# Patient Record
Sex: FEMALE | Race: OTHER | Employment: FULL TIME | URBAN - METROPOLITAN AREA
[De-identification: names, ages, dates, MRNs, and addresses within clinical notes are randomized per-mention and may not be internally consistent; named-entity substitution may affect disease eponyms.]

---

## 2018-10-16 ENCOUNTER — HOSPITAL ENCOUNTER (EMERGENCY)
Age: 33
Discharge: HOME | End: 2018-10-17
Attending: EMERGENCY MEDICINE
Payer: COMMERCIAL

## 2018-10-16 ENCOUNTER — APPOINTMENT (OUTPATIENT)
Dept: ULTRASOUND IMAGING | Age: 33
End: 2018-10-16
Attending: PHYSICIAN ASSISTANT
Payer: COMMERCIAL

## 2018-10-16 DIAGNOSIS — O03.9 MISCARRIAGE: Primary | ICD-10-CM

## 2018-10-16 DIAGNOSIS — D64.9 ANEMIA, UNSPECIFIED TYPE: ICD-10-CM

## 2018-10-16 LAB
ABO + RH BLD: NORMAL
ALBUMIN SERPL-MCNC: 3.2 G/DL (ref 3.5–5)
ALBUMIN/GLOB SERPL: 0.8 {RATIO} (ref 1.1–2.2)
ALP SERPL-CCNC: 65 U/L (ref 45–117)
ALT SERPL-CCNC: 18 U/L (ref 12–78)
ANION GAP SERPL CALC-SCNC: 10 MMOL/L (ref 5–15)
AST SERPL-CCNC: 14 U/L (ref 15–37)
BASOPHILS # BLD: 0 K/UL (ref 0–0.1)
BASOPHILS NFR BLD: 0 % (ref 0–1)
BILIRUB SERPL-MCNC: 0.2 MG/DL (ref 0.2–1)
BLOOD GROUP ANTIBODIES SERPL: NORMAL
BUN SERPL-MCNC: 8 MG/DL (ref 6–20)
BUN/CREAT SERPL: 10 (ref 12–20)
CALCIUM SERPL-MCNC: 8.6 MG/DL (ref 8.5–10.1)
CHLORIDE SERPL-SCNC: 97 MMOL/L (ref 97–108)
CO2 SERPL-SCNC: 22 MMOL/L (ref 21–32)
COMMENT, HOLDF: NORMAL
CREAT SERPL-MCNC: 0.82 MG/DL (ref 0.55–1.02)
DIFFERENTIAL METHOD BLD: ABNORMAL
EOSINOPHIL # BLD: 0.1 K/UL (ref 0–0.4)
EOSINOPHIL NFR BLD: 1 % (ref 0–7)
ERYTHROCYTE [DISTWIDTH] IN BLOOD BY AUTOMATED COUNT: 16.4 % (ref 11.5–14.5)
GLOBULIN SER CALC-MCNC: 4.1 G/DL (ref 2–4)
GLUCOSE SERPL-MCNC: 104 MG/DL (ref 65–100)
HCT VFR BLD AUTO: 36 % (ref 35–47)
HGB BLD-MCNC: 11.6 G/DL (ref 11.5–16)
HGB BLD-MCNC: 9.9 G/DL (ref 11.5–16)
IMM GRANULOCYTES # BLD: 0 K/UL (ref 0–0.04)
IMM GRANULOCYTES NFR BLD AUTO: 0 % (ref 0–0.5)
LYMPHOCYTES # BLD: 2.8 K/UL (ref 0.8–3.5)
LYMPHOCYTES NFR BLD: 27 % (ref 12–49)
MCH RBC QN AUTO: 25.9 PG (ref 26–34)
MCHC RBC AUTO-ENTMCNC: 32.2 G/DL (ref 30–36.5)
MCV RBC AUTO: 80.4 FL (ref 80–99)
MONOCYTES # BLD: 0.7 K/UL (ref 0–1)
MONOCYTES NFR BLD: 7 % (ref 5–13)
NEUTS SEG # BLD: 6.7 K/UL (ref 1.8–8)
NEUTS SEG NFR BLD: 65 % (ref 32–75)
NRBC # BLD: 0 K/UL (ref 0–0.01)
NRBC BLD-RTO: 0 PER 100 WBC
PLATELET # BLD AUTO: 339 K/UL (ref 150–400)
PMV BLD AUTO: 10.3 FL (ref 8.9–12.9)
POTASSIUM SERPL-SCNC: 4 MMOL/L (ref 3.5–5.1)
PROT SERPL-MCNC: 7.3 G/DL (ref 6.4–8.2)
RBC # BLD AUTO: 4.48 M/UL (ref 3.8–5.2)
SAMPLES BEING HELD,HOLD: NORMAL
SODIUM SERPL-SCNC: 129 MMOL/L (ref 136–145)
SPECIMEN EXP DATE BLD: NORMAL
WBC # BLD AUTO: 10.4 K/UL (ref 3.6–11)

## 2018-10-16 PROCEDURE — 80053 COMPREHEN METABOLIC PANEL: CPT | Performed by: PHYSICIAN ASSISTANT

## 2018-10-16 PROCEDURE — 76801 OB US < 14 WKS SINGLE FETUS: CPT

## 2018-10-16 PROCEDURE — 74011250636 HC RX REV CODE- 250/636: Performed by: PHYSICIAN ASSISTANT

## 2018-10-16 PROCEDURE — 36415 COLL VENOUS BLD VENIPUNCTURE: CPT | Performed by: EMERGENCY MEDICINE

## 2018-10-16 PROCEDURE — 85018 HEMOGLOBIN: CPT | Performed by: PHYSICIAN ASSISTANT

## 2018-10-16 PROCEDURE — 85025 COMPLETE CBC W/AUTO DIFF WBC: CPT | Performed by: PHYSICIAN ASSISTANT

## 2018-10-16 PROCEDURE — 96360 HYDRATION IV INFUSION INIT: CPT

## 2018-10-16 PROCEDURE — 86900 BLOOD TYPING SEROLOGIC ABO: CPT | Performed by: PHYSICIAN ASSISTANT

## 2018-10-16 PROCEDURE — 99284 EMERGENCY DEPT VISIT MOD MDM: CPT

## 2018-10-16 RX ADMIN — SODIUM CHLORIDE 1000 ML: 900 INJECTION, SOLUTION INTRAVENOUS at 22:00

## 2018-10-17 VITALS
WEIGHT: 139 LBS | HEART RATE: 78 BPM | OXYGEN SATURATION: 98 % | HEIGHT: 65 IN | RESPIRATION RATE: 14 BRPM | BODY MASS INDEX: 23.16 KG/M2 | SYSTOLIC BLOOD PRESSURE: 103 MMHG | DIASTOLIC BLOOD PRESSURE: 54 MMHG

## 2018-10-17 PROCEDURE — 96372 THER/PROPH/DIAG INJ SC/IM: CPT

## 2018-10-17 PROCEDURE — 74011250636 HC RX REV CODE- 250/636: Performed by: PHYSICIAN ASSISTANT

## 2018-10-17 RX ORDER — METHYLERGONOVINE MALEATE 0.2 MG/1
0.2 TABLET ORAL EVERY 6 HOURS
Qty: 3 TAB | Refills: 0 | Status: SHIPPED | OUTPATIENT
Start: 2018-10-17 | End: 2018-10-18

## 2018-10-17 RX ORDER — METHYLERGONOVINE MALEATE 0.2 MG/ML
0.2 INJECTION INTRAVENOUS ONCE
Status: COMPLETED | OUTPATIENT
Start: 2018-10-17 | End: 2018-10-17

## 2018-10-17 RX ORDER — IBUPROFEN 800 MG/1
800 TABLET ORAL
Qty: 20 TAB | Refills: 0 | Status: SHIPPED | OUTPATIENT
Start: 2018-10-17 | End: 2018-10-24

## 2018-10-17 RX ADMIN — METHYLERGONOVINE MALEATE 0.2 MG: 0.2 INJECTION, SOLUTION INTRAMUSCULAR; INTRAVENOUS at 00:16

## 2018-10-17 NOTE — DISCHARGE INSTRUCTIONS
Anemia: Care Instructions  Your Care Instructions    Anemia is a low level of red blood cells, which carry oxygen throughout your body. Many things can cause anemia. Lack of iron is one of the most common causes. Your body needs iron to make hemoglobin, a substance in red blood cells that carries oxygen from the lungs to your body's cells. Without enough iron, the body produces fewer and smaller red blood cells. As a result, your body's cells do not get enough oxygen, and you feel tired and weak. And you may have trouble concentrating. Bleeding is the most common cause of a lack of iron. You may have heavy menstrual bleeding or bleeding caused by conditions such as ulcers, hemorrhoids, or cancer. Regular use of aspirin or other anti-inflammatory medicines (such as ibuprofen) also can cause bleeding in some people. A lack of iron in your diet also can cause anemia, especially at times when the body needs more iron, such as during pregnancy, infancy, and the teen years. Your doctor may have prescribed iron pills. It may take several months of treatment for your iron levels to return to normal. Your doctor also may suggest that you eat foods that are rich in iron, such as meat and beans. There are many other causes of anemia. It is not always due to a lack of iron. Finding the specific cause of your anemia will help your doctor find the right treatment for you. Follow-up care is a key part of your treatment and safety. Be sure to make and go to all appointments, and call your doctor if you are having problems. It's also a good idea to know your test results and keep a list of the medicines you take. How can you care for yourself at home? · Take your medicines exactly as prescribed. Call your doctor if you think you are having a problem with your medicine. · If your doctor recommends iron pills, take them as directed:  ¨ Try to take the pills on an empty stomach about 1 hour before or 2 hours after meals. But you may need to take iron with food to avoid an upset stomach. ¨ Do not take antacids or drink milk or caffeine drinks (such as coffee, tea, or cola) at the same time or within 2 hours of the time that you take your iron. They can make it hard for your body to absorb the iron. ¨ Vitamin C (from food or supplements) helps your body absorb iron. Try taking iron pills with a glass of orange juice or some other food that is high in vitamin C, such as citrus fruits. ¨ Iron pills may cause stomach problems, such as heartburn, nausea, diarrhea, constipation, and cramps. Be sure to drink plenty of fluids, and include fruits, vegetables, and fiber in your diet each day. Iron pills often make your bowel movements dark or green. ¨ If you forget to take an iron pill, do not take a double dose of iron the next time you take a pill. ¨ Keep iron pills out of the reach of small children. An overdose of iron can be very dangerous. · Follow your doctor's advice about eating iron-rich foods. These include red meat, shellfish, poultry, eggs, beans, raisins, whole-grain bread, and leafy green vegetables. · Steam vegetables to help them keep their iron content. When should you call for help? Call 911 anytime you think you may need emergency care. For example, call if:    · You have symptoms of a heart attack. These may include:  ¨ Chest pain or pressure, or a strange feeling in the chest.  ¨ Sweating. ¨ Shortness of breath. ¨ Nausea or vomiting. ¨ Pain, pressure, or a strange feeling in the back, neck, jaw, or upper belly or in one or both shoulders or arms. ¨ Lightheadedness or sudden weakness. ¨ A fast or irregular heartbeat. After you call 911, the  may tell you to chew 1 adult-strength or 2 to 4 low-dose aspirin. Wait for an ambulance.  Do not try to drive yourself.     · You passed out (lost consciousness).    Call your doctor now or seek immediate medical care if:    · You have new or increased shortness of breath.     · You are dizzy or lightheaded, or you feel like you may faint.     · Your fatigue and weakness continue or get worse.     · You have any abnormal bleeding, such as:  ¨ Nosebleeds. ¨ Vaginal bleeding that is different (heavier, more frequent, at a different time of the month) than what you are used to. ¨ Bloody or black stools, or rectal bleeding. ¨ Bloody or pink urine.    Watch closely for changes in your health, and be sure to contact your doctor if:    · You do not get better as expected. Where can you learn more? Go to http://annalise-arti.info/. Enter R301 in the search box to learn more about \"Anemia: Care Instructions. \"  Current as of: May 7, 2018  Content Version: 11.8  © 0589-8257 [employer]. Care instructions adapted under license by [employer] (which disclaims liability or warranty for this information). If you have questions about a medical condition or this instruction, always ask your healthcare professional. [place] [address] any warranty or liability for your use of this information. Miscarriage: Care Instructions  Your Care Instructions    The loss of a pregnancy can be very hard. You may wonder why it happened or blame yourself. Miscarriages are common and are not caused by exercise, stress, or sex. Most happen because the fertilized egg in the uterus does not develop normally. There is no treatment that can stop a miscarriage. As long as you do not have heavy blood loss, fever, weakness, or other signs of infection, you can let a miscarriage follow its own course. This can take several days. Your body will recover over the next several weeks. Having a miscarriage does not mean you cannot have a normal pregnancy in the future. The doctor has checked you carefully, but problems can develop later. If you notice any problems or new symptoms, get medical treatment right away.   Follow-up care is a key part of your treatment and safety. Be sure to make and go to all appointments, and call your doctor if you are having problems. It's also a good idea to know your test results and keep a list of the medicines you take. How can you care for yourself at home? · You will probably have some vaginal bleeding for 1 to 2 weeks. It may be similar to or slightly heavier than a normal period. The bleeding should get lighter after a week. Use pads instead of tampons. You may use tampons during your next period, which should start in 3 to 6 weeks. · Take an over-the-counter pain medicine, such as acetaminophen (Tylenol), ibuprofen (Advil, Motrin), or naproxen (Aleve) for cramps. Read and follow all instructions on the label. You may have cramps for several days after the miscarriage. · Do not take two or more pain medicines at the same time unless the doctor told you to. Many pain medicines have acetaminophen, which is Tylenol. Too much acetaminophen (Tylenol) can be harmful. · Use a clear container to save any tissue that you pass. Take it to your doctor's office as soon as you can. · Do not have sex until the bleeding stops. · You may return to your normal activities if you feel well enough to do so. But you should avoid heavy exercise until the bleeding stops. · If you plan to get pregnant again, check with your doctor. Most doctors suggest waiting until you have had at least one normal period before you try to get pregnant. · If you do not want to get pregnant, ask your doctor about birth control. You can get pregnant again before your next period starts if you are not using birth control. · You may be low in iron because of blood loss. Eat a balanced diet that is high in iron and vitamin C. Foods rich in iron include red meat, shellfish, eggs, beans, and leafy green vegetables. Foods high in vitamin C include citrus fruits, tomatoes, and broccoli.  Talk to your doctor about whether you need to take iron pills or a multivitamin. · The loss of a pregnancy can be very hard. You may wonder why it happened and blame yourself. Talking to family members, friends, a counselor, or your doctor may help you cope with your loss. When should you call for help? Call 911 anytime you think you may need emergency care. For example, call if:    · You passed out (lost consciousness).    Call your doctor now or seek immediate medical care if:    · You have severe vaginal bleeding.     · You are dizzy or lightheaded, or you feel like you may faint.     · You have new or worse pain in your belly or pelvis.     · You have a fever.     · You have vaginal discharge that smells bad.    Watch closely for changes in your health, and be sure to contact your doctor if:    · You do not get better as expected. Where can you learn more? Go to http://annaliselogolineuparti.info/. Enter E802 in the search box to learn more about \"Miscarriage: Care Instructions. \"  Current as of: November 21, 2017  Content Version: 11.8  © 1863-1385 [employer]. Care instructions adapted under license by [employer] (which disclaims liability or warranty for this information). If you have questions about a medical condition or this instruction, always ask your healthcare professional. [address] any warranty or liability for your use of this information. We hope that we have addressed all of your medical concerns. The examination and treatment you received in the Emergency Department were for an emergent problem and were not intended as complete care. It is important that you follow up with your healthcare provider(s) for ongoing care. If your symptoms worsen or do not improve as expected, and you are unable to reach your usual health care provider(s), you should return to the Emergency Department.       Today's healthcare is undergoing tremendous change, and patient satisfaction surveys are one of the many tools to assess the quality of medical care. You may receive a survey from the [employer] regarding your experience in the Emergency Department. I hope that your experience has been completely positive, particularly the medical care that I provided. As such, please participate in the survey; anything less than excellent does not meet my expectations or intentions. [facility] and [facility] [place] participate in nationally recognized quality of care measures. If your blood pressure is greater than 120/80, as reported below, we urge that you seek medical care to address the potential of high blood pressure, commonly known as hypertension. Hypertension can be hereditary or can be caused by certain medical conditions, pain, stress, or \"white coat syndrome. \"       Please make an appointment with your health care provider(s) for follow up of your Emergency Department visit. VITALS:   Patient Vitals for the past 8 hrs:   Pulse Resp BP SpO2   10/16/18 2316 78 17 102/60 -   10/16/18 2116 (!) 116 20 120/59 97 %          Thank you for allowing us to provide you with medical care today. We realize that you have many choices for your emergency care needs. Please choose us in the future for any continued health care needs. Liss Joe, [address] [place] [address].   Office: [phone]            Recent Results (from the past 24 hour(s))   CBC WITH AUTOMATED DIFF    Collection Time: 10/16/18  9:21 PM   Result Value Ref Range    WBC 10.4 3.6 - 11.0 K/uL    RBC 4.48 3.80 - 5.20 M/uL    HGB 11.6 11.5 - 16.0 g/dL    HCT 36.0 35.0 - 47.0 %    MCV 80.4 80.0 - 99.0 FL    MCH 25.9 (L) 26.0 - 34.0 PG    MCHC 32.2 30.0 - 36.5 g/dL    RDW 16.4 (H) 11.5 - 14.5 %    PLATELET 739 052 - 915 K/uL    MPV 10.3 8.9 - 12.9 FL    NRBC 0.0 0  WBC    ABSOLUTE NRBC 0.00 0.00 - 0.01 K/uL    NEUTROPHILS 65 32 - 75 %    LYMPHOCYTES 27 12 - 49 % MONOCYTES 7 5 - 13 %    EOSINOPHILS 1 0 - 7 %    BASOPHILS 0 0 - 1 %    IMMATURE GRANULOCYTES 0 0.0 - 0.5 %    ABS. NEUTROPHILS 6.7 1.8 - 8.0 K/UL    ABS. LYMPHOCYTES 2.8 0.8 - 3.5 K/UL    ABS. MONOCYTES 0.7 0.0 - 1.0 K/UL    ABS. EOSINOPHILS 0.1 0.0 - 0.4 K/UL    ABS. BASOPHILS 0.0 0.0 - 0.1 K/UL    ABS. IMM. GRANS. 0.0 0.00 - 0.04 K/UL    DF AUTOMATED     METABOLIC PANEL, COMPREHENSIVE    Collection Time: 10/16/18  9:21 PM   Result Value Ref Range    Sodium 129 (L) 136 - 145 mmol/L    Potassium 4.0 3.5 - 5.1 mmol/L    Chloride 97 97 - 108 mmol/L    CO2 22 21 - 32 mmol/L    Anion gap 10 5 - 15 mmol/L    Glucose 104 (H) 65 - 100 mg/dL    BUN 8 6 - 20 MG/DL    Creatinine 0.82 0.55 - 1.02 MG/DL    BUN/Creatinine ratio 10 (L) 12 - 20      GFR est AA >60 >60 ml/min/1.73m2    GFR est non-AA >60 >60 ml/min/1.73m2    Calcium 8.6 8.5 - 10.1 MG/DL    Bilirubin, total 0.2 0.2 - 1.0 MG/DL    ALT (SGPT) 18 12 - 78 U/L    AST (SGOT) 14 (L) 15 - 37 U/L    Alk. phosphatase 65 45 - 117 U/L    Protein, total 7.3 6.4 - 8.2 g/dL    Albumin 3.2 (L) 3.5 - 5.0 g/dL    Globulin 4.1 (H) 2.0 - 4.0 g/dL    A-G Ratio 0.8 (L) 1.1 - 2.2     TYPE & SCREEN    Collection Time: 10/16/18  9:21 PM   Result Value Ref Range    Crossmatch Expiration 10/19/2018     ABO/Rh(D) O POSITIVE     Antibody screen NEG    SAMPLES BEING HELD    Collection Time: 10/16/18  9:22 PM   Result Value Ref Range    SAMPLES BEING HELD [email or web address]     COMMENT        Add-on orders for these samples will be processed based on acceptable specimen integrity and analyte stability, which may vary by analyte. HEMOGLOBIN    Collection Time: 10/16/18 11:18 PM   Result Value Ref Range    HGB 9.9 (L) 11.5 - 16.0 g/dL       Us Preg Uts < 14 Wks Sngl    Result Date: 10/17/2018  EXAM:  US PREG UTS < 14 WKS SNGL INDICATION:  Vaginal bleeding during first trimester pregnancy, nonviable fetus during obstetric ultrasound one day ago. LMP on 2018.  A1. COMPARISON:  None. TECHNIQUE: Transabdominal pelvic ultrasound. Patient declined endovaginal ultrasound. FINDINGS: Transabdominal ultrasound: Urinary bladder: Nondistended. Uterus: measures 12.6 x 5.7 x 6.1 cm, which is within normal limits during pregnancy. There is no sonographic myometrial abnormality. Endometrium: 2 cm in thickness, mildly heterogeneous. No intrauterine pregnancy. Right ovary: 2.9 x 1.7 x 2.1 cm. Blood flow is present in the right ovary. No right adnexal mass or cyst. Left ovary: 2.9 x 2.7 x 1.4 cm. Blood flow is present in the left ovary. No adnexal cyst or mass. Free fluid: None. Fluid is in the vagina. Endovaginal ultrasound: Not performed, patient declined. IMPRESSION: Thickened endometrium without intrauterine pregnancy. Fluid in the vagina. Findings are most compatible with nearly completed spontaneous .

## 2018-10-17 NOTE — ED PROVIDER NOTES
HPI Comments: Cesar Jackson is a 34 y.o. female  who presents by private vehicle to ER with c/o Patient presents with: 
Miscarriage. PAtient reports she is approximately 9-10 weeks pregnant, patient was seen by her OB yesterday and had an [address] showing no fetal heart beat. Patient reports vaginal bleeding that started 1 hour prior to arrival. Patient reports mild lower abdominal pain. She specifically denies any fevers, chills, nausea, vomiting, chest pain, shortness of breath, headache, rash, diarrhea, abdominal pain, urinary/bowel changes, sweating or weight loss. PCP: No primary care provider on file. PMHx significant for: No past medical history on file. PSHx significant for: No past surgical history on file. Social Hx: Tobacco use: Smoking status: Not on file Smokeless status: Not on file                    
; EtOH use: The patient states she drinks 0 per week.; Illicit Drug use: Allergies: 
No Known Allergies There are no other complaints, changes or physical findings at this time. Patient is a 34 y.o. female presenting with miscarriage. The history is provided by the patient. Miscarriage This is a new problem. The current episode started 1 to 2 hours ago. The problem occurs constantly. The problem has not changed since onset. The pain is at a severity of 4/10. The pain is mild. Pertinent negatives include no anorexia, no fever, no belching, no diarrhea, no flatus, no hematochezia, no melena, no nausea, no vomiting, no constipation, no dysuria, no frequency, no hematuria, no headaches, no arthralgias, no myalgias, no trauma, no chest pain, no testicular pain and no back pain. Nothing worsens the pain. The pain is relieved by nothing. The patient's surgical history non-contributory. No past medical history on file. No past surgical history on file. No family history on file. Social History Social History  Marital status: N/A  
 Spouse name: N/A  
 Number of children: N/A  
 Years of education: N/A Occupational History  Not on file. Social History Main Topics  Smoking status: Not on file  Smokeless tobacco: Not on file  Alcohol use Not on file  Drug use: Not on file  Sexual activity: Not on file Other Topics Concern  Not on file Social History Narrative ALLERGIES: Review of patient's allergies indicates no known allergies. Review of Systems Constitutional: Negative. Negative for fever. HENT: Negative. Eyes: Negative. Respiratory: Negative. Cardiovascular: Negative. Negative for chest pain. Gastrointestinal: Positive for abdominal pain. Negative for anorexia, constipation, diarrhea, flatus, hematochezia, melena, nausea and vomiting. Endocrine: Negative. Genitourinary: Positive for vaginal bleeding. Negative for dysuria, frequency, hematuria and testicular pain. Musculoskeletal: Negative. Negative for arthralgias, back pain and myalgias. Skin: Negative. Allergic/Immunologic: Negative. Neurological: Negative. Negative for headaches. Hematological: Negative. Psychiatric/Behavioral: Negative. All other systems reviewed and are negative. Vitals:  
 10/16/18 2116 BP: 120/59 Pulse: (!) 116 Resp: 20 SpO2: 97% Weight: 63 kg (139 lb) Height: 5' 5\" (1.651 m) Physical Exam  
Constitutional: She is oriented to person, place, and time. She appears well-developed. Non-toxic appearance. She does not have a sickly appearance. She does not appear ill. No distress. HENT:  
Head: Normocephalic and atraumatic. Right Ear: External ear normal.  
Left Ear: External ear normal.  
Nose: Nose normal.  
Mouth/Throat: Oropharynx is clear and moist. No oropharyngeal exudate. Eyes: Conjunctivae, EOM and lids are normal. Right eye exhibits no discharge. Left eye exhibits no discharge. Neck: Normal range of motion. No tracheal deviation present. No thyromegaly present. Cardiovascular: Regular rhythm, normal heart sounds, intact distal pulses and normal pulses. Tachycardia present. Pulmonary/Chest: Effort normal and breath sounds normal.  
Abdominal: Soft. Normal appearance and bowel sounds are normal. There is no tenderness. Genitourinary: There is bleeding in the vagina. Genitourinary Comments: Patient with large amount of blood and clots in vaginal vault. 2 large clots removed from vaginal vault. Musculoskeletal: Normal range of motion. Neurological: She is alert and oriented to person, place, and time. Skin: Skin is warm and dry. Psychiatric: She has a normal mood and affect. Judgment normal.  
  
 
MDM Number of Diagnoses or Management Options Anemia, unspecified type:  
Miscarriage:  
Diagnosis management comments: Assesment/Plan- 34 y.o. Patient presents with: 
Miscarriage 
differential includes: miscarriage, missed , anemia, retained products of conception. Labs and imaging reviewed with anemia. Patient seen by [facility] resident. Discharged with close follow up, return precautions given. Recommend OB follow up. Patient educated on reasons to return to the ED. Amount and/or Complexity of Data Reviewed Clinical lab tests: ordered and reviewed Tests in the radiology section of CPT®: ordered and reviewed Tests in the medicine section of CPT®: reviewed and ordered ED Course Procedures CONSULT NOTE:  
12:10 AM 
Anne Marie Joe PA-C spoke with Dr. Mau Renteria, Specialty: OB-hospitalist 
Discussed pt's hx, disposition, and available diagnostic and imaging results. Reviewed care plans. Consultant agrees with plans as outlined. Recommended methergine 0.2mg IM to slow down vaginal bleeding.

## 2018-10-17 NOTE — ED TRIAGE NOTES
Pt arrives with report of miscarriage, actively bleeding. Pt reports visiting OB yesterday, she was told that they were unable to find a heart beat and she needed to return in 2 weeks. Second pregnancy, miscarriage with previous pregnancy

## 2018-10-17 NOTE — PROGRESS NOTES
Gynecology Consult Name: Letty Hodgkin MRN: [id number] SSN: [id number] YOB: 1985  Age: 34 y.o. Sex: female Subjective: Chief complaint:  Abnormal uterine bleeding Rodrigo Carmona is a 34 y.o.  female  currently 9-10 weeks pregnant who comes in to the ED for a chief complain of vaginal bleeding that started on 10/16 at about 2100. Pt is followed OP by her OB Dr. Heriberto Healy at [facility] and was seen there on 10/15 for an US. At that time a fetal HB was not found and pt was asked to return in 2 weeks. Today patient states bleeding is consistent with a heavy period with cramping. She reports that the abdominal pain is mild. OB hx consists of previous pregnancy 1.5 yrs ago with no complications with delivery at 41 weeks She denies any SOB, CP, Dizziness, HA, fever, chills or nausea. OB History No data available No Known Allergies Current Outpatient Medications Medication Sig Dispense Refill  methylergonovine (METHERGINE) 0.2 mg tablet Take 1 Tab by mouth every six (6) hours for 3 doses. 3 Tab 0  ibuprofen (MOTRIN) 800 mg tablet Take 1 Tab by mouth every six (6) hours as needed for Pain for up to 7 days. 20 Tab 0 No family history on file. Social History Socioeconomic History  Marital status:  Spouse name: Not on file  Number of children: Not on file  Years of education: Not on file  Highest education level: Not on file Social Needs  Financial resource strain: Not on file  Food insecurity - worry: Not on file  Food insecurity - inability: Not on file  Transportation needs - medical: Not on file  Transportation needs - non-medical: Not on file Occupational History  Not on file Tobacco Use  Smoking status: Not on file Substance and Sexual Activity  Alcohol use: Not on file  Drug use: Not on file  Sexual activity: Not on file Other Topics Concern  Not on file Social History Narrative  Not on file Review of Systems Constitutional: Negative for chills, fever, malaise/fatigue and weight loss. Respiratory: Negative for cough, shortness of breath and wheezing. Cardiovascular: Negative for chest pain and palpitations. Gastrointestinal: Positive for abdominal pain. Negative for blood in stool, constipation, diarrhea, heartburn, nausea and vomiting. Skin: Negative for itching and rash. Neurological: Negative for dizziness, tingling, tremors and headaches. Objective:  
 
Vitals:  
 10/17/18 4387 10/17/18 0115 10/17/18 0116 10/17/18 9953 BP: 103/54 Pulse: 84 79 78 Resp: 13 17 14 SpO2: 98% 98% 98% 98% Weight:      
Height:      
 
 
General:  alert, cooperative, no distress, appears stated age Skin:  Normal.  
Lungs:  clear to auscultation bilaterally Heart:  regular rate and rhythm, S1, S2 normal, no murmur, click, rub or gallop Abdomen: soft, non-tender. Bowel sounds normal. No masses,  no organomegaly Genitourinary: Moderate bleeding from vaginal vault Extremities:  extremities normal, atraumatic, no cyanosis or edema, no edema Imaging: 
 
IMPRESSION:   
Thickened endometrium without intrauterine pregnancy. Fluid in the vagina. Findings are most compatible with nearly completed spontaneous  Plan:  
 
First Trimester Spontaneous : Hgb POA: 11.6, Hgb on dc: 9.9 
- Transvaginal US: nearly completed spontaneous  - Methergine IM 0.2 mg once in ED  
- Methergine 0.2 mg q6h for 3 doses on dc - Pt to follow up with OB within 24 hrs of dc Discussed the risks of surgery including the risks of bleeding, infection, deep vein thrombosis, and surgical injuries to internal organs including but not limited to the bowels, bladder, rectum, and female reproductive organs. The patient understands the risks; any and all questions were answered to the patient's satisfaction. Patient seen, examined, and discussed with Dr. Carrillo Grant Signed By:  Darlene Mederos MD   
 October 17, 2018

## 2018-10-17 NOTE — ED NOTES
Verbal shift change report given to Jose Luo RN (oncoming nurse) by Ye Bowden RN (offgoing nurse). Report included the following information ED Summary.

## 2023-01-12 LAB
C. TRACHOMATIS, EXTERNAL RESULT: NEGATIVE
N. GONORRHOEAE, EXTERNAL RESULT: NEGATIVE

## 2023-01-26 ENCOUNTER — HOSPITAL ENCOUNTER (OUTPATIENT)
Dept: PERINATAL CARE | Age: 38
Discharge: HOME | End: 2023-01-26
Attending: OBSTETRICS & GYNECOLOGY
Payer: COMMERCIAL

## 2023-01-26 PROCEDURE — 76813 OB US NUCHAL MEAS 1 GEST: CPT | Performed by: OBSTETRICS & GYNECOLOGY

## 2023-01-26 NOTE — PROGRESS NOTES
Ethyl Castleman was seen on January 26th, 2023 in our [address] [place] [address] office for genetic counseling regarding advanced maternal age and genetic screening available during pregnancy. Frances Ahmadi is 30years old and will be 37 at the ELIZA; [id number]. The ELIZA for this pregnancy is 8/8/2023. Genetic counseling was performed in person today. The patient was accompanied to her appointment by her partner and father of the baby (FOB), Gael. Impression and Recommendations:    - Advanced maternal age was reviewed with the patient, including relevant genetic screening and testing options. - The patient ELECTED to proceed with NIPT, which was ordered today through [facility]. - The patient DECLINED both diagnostic testing options and all carrier screening options at this time. - Due to the patient's history of a child with spina bifida occulta, a msAFP is recommended between 13 and 24 weeks gestation to screen for open neural tube defects in the current pregnancy. - An ultrasound and MFM consult were performed today by Dr. Pj Garcia MD. Please see her note for further details. The following information was discussed with the patient:    The association between advancing maternal age and increasing risk of chromosomal aneuploidy was reviewed. Based on the patient's age of 30 at delivery, the risk to have a baby with Down syndrome is 1 in 26 and the risk to have a baby with any chromosomal aneuploidy is 1 in 80. The benefits, risks and limitations of non-invasive prenatal testing (NIPT), ultrasonography, CVS and amniocentesis in the diagnosis of fetal aneuploidy were reviewed. Non-invasive prenatal testing (NIPT) was discussed as a genetic screening option. NIPT uses maternal serum to acquire circulating cell-free fetal DNA. The testing can be performed using one of two broad types of technology.   In the first, the cell-free fetal DNA is obtained via a maternal blood draw and then converted into a genomic DNA library for the determination of chromosome 21, 18, 13, and X and Y representation based on massively parallel genomic sequencing. This testing detects Down syndrome with greater than 99% accuracy, Trisomy 18 with >97% sensitivity, and Trisomy 13 with greater than 87% sensitivity. Trinh syndrome is detected in 95% of cases, and gender is accurately predicted in >99% of cases. With the second method, single-nucleotide polymorphisms (SNPs) are used to perform targeted sequencing of areas of interest (representing chromosomes 21, 18, 13, and the sex chromosomes). Triploidy can also be reliably detected. Testing for twin pregnancies can now be performed via the SNP method. The sensitivity is thought to be greater than 99% across all 4 pairs of chromosomes targeted in almaraz pregnancies. Which of these technologies is appropriate for a given patient depends upon several factors and is determined by the genetic counselor. Testing is offered most frequently for women considered at high risk for chromosomal aneuploidy. This includes but is not limited to indications such as advanced maternal age, abnormal maternal serum screening result, ultrasound abnormalities, and previous history of a child with a chromosomal aneuploidy. ACOG recently published new recommendations that this testing also be offered as a screening option in low risk pregnancies, although insurance typically does not cover it. While this type of testing is often called \"non-invasive prenatal diagnosis,\" it is important to distinguish that this testing is not considered diagnostic. There is the possibility of an inconclusive result. Inconclusive results would require follow-up CVS or amniocentesis to determine fetal karyotype. All abnormal results should be followed up with a confirmatory CVS or amniocentesis.  Unlike CVS and amniocentesis, this test cannot detect other chromosome abnormalities such as chromosomal rearrangements or mosaicism. Currently, insurance companies may or may not cover the cost of this testing. We always provide patients with the most up to date price quotes and coverage information that we have in [facility]. Because insurance companies are so rapidly changing what will be covered or declined, we cannot guarantee a precise patient bill. For patients that elect NIPT, a single marker AFP can also be ordered after 15 weeks gestation to determine open neural tube defect (ONTD) risk information. Chorionic villus sampling (CVS) is typically performed between 11 and 14 weeks gestation during pregnancy. The test removes a small sample of the placenta, either using a needle through the stomach (transabdominal CVS) or a catheter through the cervix (transcervical CVS). The risk of complications that could lead to a miscarriage is approximately 1 in 300. Amniocentesis is typically performed between 12 and 20 weeks gestation, although it can often be performed earlier or later with reasonable safety, depending on the circumstances of the case. The risk for complication from amniocentesis is 1/800. The accuracy of amniocentesis is greater than 99% for chromosomal abnormalities such as aneuploidy, and approximately 98% for open neural tube defects when used in combination with detailed anatomic ultrasound. The accuracy of other genetic testing ordered varies depending on the condition being tested for and the laboratory performing the testing. In addition, there are conditions that cannot be tested for prenatally, and would therefore not be detectable with amniocentesis. At this time the patient ELECTED to proceed with NIPT. Results are pending and will be communicated to the patient as soon as they are available. The availability, benefits, risks, and limitations of genetic carrier screening was reviewed with the couple.  The most recent ACOG guidelines recommend that all pregnant patients be offered cystic fibrosis (CF) and spinal muscular atrophy (SMA) carrier screening. Both CF and SMA are recessive conditions which would result in a 25% risk for an affected child in the event both parents were carriers. Expanded carrier screening looks at up to 274 different conditions (including CF and SMA), with a variety of other panel options in between depending on the patient's interest and concern for cost. At this time the patient DECLINED all carrier screening options. Patricia Gurrola reports that one of her previous children has spina bifida occulta. NTDs have an incidence of about 2-4 per 1,000 births, with the highest incidence in  and  populations. Neural tube closure takes place in the 4th week of gestation. Failure of the neural tube to close at the caudal end results in spina bifida; failure of the neural tube to close at the cranial end results in anencephaly. The clinical severity of NTDs is related to the location of the lesion and extent of involvement. In the absence of another etiology (i.e., syndrome), inheritance for NTDs is considered to be multifactorial with recurrence risks based on empiric data (3-5% for first-degree relatives). We discussed the etiology, prognosis and recurrence risks for NTDs. SBO is a mild form of spina bifida present in up to 20% of the population. Most commonly, this condition involves one vertebral arch and is considered to be part of normal variation without any clinical significance. Patricia Gurrola states that she was taking prenatal vitamins before becoming pregnant. Most multivitamins contain 0.4 mg of folic acid. The availability of folic acid supplementation before the couple's next pregnancy as a precaution was reviewed. It was emphasized that taking extra folic acid does not ELIMINATE the risk to have a child with a NTD, but it does significantly decrease the risk. Likewise, not taking folic acid does not guarantee that the pregnancy will have a NTD.  Based on this family history, the risks, benefits and limitations of maternal serum AFP, amniocentesis and level II ultrasound in screening for ONTDs were discussed. After our discussion, Manohar Elena and Sunny Cummings verbalized understanding of the information presented to them and had no further questions or concerns at this time. Toni Whalen MS, [facility]  Licensed, Certified Genetic Counselor    30 minutes were spent in person with the patient for genetic evaluation including risk assessment, counseling regarding relevant genetic disorders, explanation of appropriate genetic testing options, and arranging genetic testing.

## 2023-02-06 ENCOUNTER — APPOINTMENT (OUTPATIENT)
Dept: ULTRASOUND IMAGING | Age: 38
End: 2023-02-06
Attending: STUDENT IN AN ORGANIZED HEALTH CARE EDUCATION/TRAINING PROGRAM
Payer: COMMERCIAL

## 2023-02-06 ENCOUNTER — HOSPITAL ENCOUNTER (EMERGENCY)
Age: 38
Discharge: HOME | End: 2023-02-06
Attending: EMERGENCY MEDICINE
Payer: COMMERCIAL

## 2023-02-06 VITALS
SYSTOLIC BLOOD PRESSURE: 115 MMHG | WEIGHT: 185 LBS | TEMPERATURE: 98.3 F | OXYGEN SATURATION: 98 % | BODY MASS INDEX: 30.82 KG/M2 | HEART RATE: 92 BPM | DIASTOLIC BLOOD PRESSURE: 80 MMHG | RESPIRATION RATE: 18 BRPM | HEIGHT: 65 IN

## 2023-02-06 DIAGNOSIS — O46.92 VAGINAL BLEEDING IN PREGNANCY, SECOND TRIMESTER: ICD-10-CM

## 2023-02-06 DIAGNOSIS — O20.0 THREATENED MISCARRIAGE: Primary | ICD-10-CM

## 2023-02-06 LAB
ALBUMIN SERPL-MCNC: 3.4 G/DL (ref 3.5–5)
ALBUMIN/GLOB SERPL: 0.8 (ref 1.1–2.2)
ALP SERPL-CCNC: 51 U/L (ref 45–117)
ALT SERPL-CCNC: 14 U/L (ref 12–78)
ANION GAP SERPL CALC-SCNC: 8 MMOL/L (ref 5–15)
APPEARANCE UR: ABNORMAL
AST SERPL-CCNC: 4 U/L (ref 15–37)
BACTERIA URNS QL MICRO: NEGATIVE /HPF
BASOPHILS # BLD: 0 K/UL (ref 0–0.1)
BASOPHILS NFR BLD: 0 % (ref 0–1)
BILIRUB SERPL-MCNC: 0.3 MG/DL (ref 0.2–1)
BILIRUB UR QL CFM: NEGATIVE
BUN SERPL-MCNC: 7 MG/DL (ref 6–20)
BUN/CREAT SERPL: 14 (ref 12–20)
CALCIUM SERPL-MCNC: 9.4 MG/DL (ref 8.5–10.1)
CHLORIDE SERPL-SCNC: 107 MMOL/L (ref 97–108)
CO2 SERPL-SCNC: 20 MMOL/L (ref 21–32)
COLOR UR: ABNORMAL
COMMENT, HOLDF: NORMAL
CREAT SERPL-MCNC: 0.51 MG/DL (ref 0.55–1.02)
DIFFERENTIAL METHOD BLD: NORMAL
EOSINOPHIL # BLD: 0.1 K/UL (ref 0–0.4)
EOSINOPHIL NFR BLD: 1 % (ref 0–7)
EPITH CASTS URNS QL MICRO: ABNORMAL /LPF
ERYTHROCYTE [DISTWIDTH] IN BLOOD BY AUTOMATED COUNT: 13.2 % (ref 11.5–14.5)
GLOBULIN SER CALC-MCNC: 4.2 G/DL (ref 2–4)
GLUCOSE SERPL-MCNC: 94 MG/DL (ref 65–100)
GLUCOSE UR STRIP.AUTO-MCNC: NEGATIVE MG/DL
HCG UR QL: POSITIVE
HCT VFR BLD AUTO: 38.8 % (ref 35–47)
HGB BLD-MCNC: 13 G/DL (ref 11.5–16)
HGB UR QL STRIP: ABNORMAL
IMM GRANULOCYTES # BLD AUTO: 0 K/UL (ref 0–0.04)
IMM GRANULOCYTES NFR BLD AUTO: 0 % (ref 0–0.5)
KETONES UR QL STRIP.AUTO: NEGATIVE MG/DL
LEUKOCYTE ESTERASE UR QL STRIP.AUTO: ABNORMAL
LYMPHOCYTES # BLD: 2.6 K/UL (ref 0.8–3.5)
LYMPHOCYTES NFR BLD: 28 % (ref 12–49)
MCH RBC QN AUTO: 28.8 PG (ref 26–34)
MCHC RBC AUTO-ENTMCNC: 33.5 G/DL (ref 30–36.5)
MCV RBC AUTO: 85.8 FL (ref 80–99)
MONOCYTES # BLD: 0.6 K/UL (ref 0–1)
MONOCYTES NFR BLD: 6 % (ref 5–13)
NEUTS SEG # BLD: 5.9 K/UL (ref 1.8–8)
NEUTS SEG NFR BLD: 65 % (ref 32–75)
NITRITE UR QL STRIP.AUTO: NEGATIVE
NRBC # BLD: 0 K/UL (ref 0–0.01)
NRBC BLD-RTO: 0 PER 100 WBC
PH UR STRIP: 6.5 (ref 5–8)
PLATELET # BLD AUTO: 304 K/UL (ref 150–400)
PMV BLD AUTO: 10.2 FL (ref 8.9–12.9)
POTASSIUM SERPL-SCNC: 3.8 MMOL/L (ref 3.5–5.1)
PROT SERPL-MCNC: 7.6 G/DL (ref 6.4–8.2)
PROT UR STRIP-MCNC: 100 MG/DL
RBC # BLD AUTO: 4.52 M/UL (ref 3.8–5.2)
RBC #/AREA URNS HPF: >100 /HPF (ref 0–5)
SAMPLES BEING HELD,HOLD: NORMAL
SODIUM SERPL-SCNC: 135 MMOL/L (ref 136–145)
SP GR UR REFRACTOMETRY: 1.01 (ref 1–1.03)
UR CULT HOLD, URHOLD: NORMAL
UROBILINOGEN UR QL STRIP.AUTO: 0.2 EU/DL (ref 0.2–1)
WBC # BLD AUTO: 9.2 K/UL (ref 3.6–11)
WBC URNS QL MICRO: ABNORMAL /HPF (ref 0–4)
YEAST URNS QL MICRO: PRESENT

## 2023-02-06 PROCEDURE — 81001 URINALYSIS AUTO W/SCOPE: CPT

## 2023-02-06 PROCEDURE — 99284 EMERGENCY DEPT VISIT MOD MDM: CPT

## 2023-02-06 PROCEDURE — 76815 OB US LIMITED FETUS(S): CPT

## 2023-02-06 PROCEDURE — 86900 BLOOD TYPING SEROLOGIC ABO: CPT

## 2023-02-06 PROCEDURE — 85025 COMPLETE CBC W/AUTO DIFF WBC: CPT

## 2023-02-06 PROCEDURE — 81025 URINE PREGNANCY TEST: CPT

## 2023-02-06 PROCEDURE — 80053 COMPREHEN METABOLIC PANEL: CPT

## 2023-02-06 PROCEDURE — 36415 COLL VENOUS BLD VENIPUNCTURE: CPT

## 2023-02-07 LAB
ABO + RH BLD: NORMAL
BLOOD BANK CMNT PATIENT-IMP: NORMAL

## 2023-02-07 NOTE — ED TRIAGE NOTES
Pt presents to the ED with c/o vaginal bleeding while 14 weeks pregnant. Pt states that the bleeding started about 45 minutes ago and that she feels like she is about to start cramping. Pt reports that she takes Lovenox injections due to her having clots in her previous pregnancy. Pt reports that she had a miscarriage during her second pregnancy.

## 2023-02-07 NOTE — ED PROVIDER NOTES
Patient is a 37-year-old female who presents ED complaining of vaginal bleeding with chart of 45 minutes prior to arrival.  Patient reports she is currently 13w6d gestation. She started with spontaneous vaginal bleeding and states she feels like she is \"about to start cramping. \"  Denies any pelvic pain, dysuria, urinary frequency, urgency, nausea, vomiting. Patient reports she had a miscarriage during her second pregnancy at about 9 weeks gestation. She was recently seen by her OB/GYN as well as perinatology and had a normal work-up performed. States she was started on Lovenox injections 2 weeks ago secondary to having a blood clot in a prior pregnancy. OB/GYN is Dr. Sona Barr, perinatologist Dr. Thalia Dickey. No past medical history on file. No past surgical history on file. No family history on file. Social History     Socioeconomic History    Marital status:      Spouse name: Not on file    Number of children: Not on file    Years of education: Not on file    Highest education level: Not on file   Occupational History    Not on file   Tobacco Use    Smoking status: Not on file    Smokeless tobacco: Not on file   Substance and Sexual Activity    Alcohol use: Not on file    Drug use: Not on file    Sexual activity: Not on file   Other Topics Concern    Not on file   Social History Narrative    Not on file     Social Determinants of Health     Financial Resource Strain: Not on file   Food Insecurity: Not on file   Transportation Needs: Not on file   Physical Activity: Not on file   Stress: Not on file   Social Connections: Not on file   Intimate Partner Violence: Not on file   Housing Stability: Not on file         ALLERGIES: Patient has no known allergies. Review of Systems   Constitutional:  Negative for chills and fever. Respiratory:  Negative for shortness of breath. Cardiovascular:  Negative for chest pain. Gastrointestinal:  Negative for abdominal pain, nausea and vomiting. Genitourinary:  Positive for vaginal bleeding. Negative for decreased urine volume, difficulty urinating, flank pain, menstrual problem, pelvic pain, vaginal discharge and vaginal pain. All other systems reviewed and are negative. Vitals:    02/06/23 1957   BP: 115/80   Pulse: 92   Resp: 18   Temp: 98.3 °F (36.8 °C)   SpO2: 98%   Weight: 83.9 kg (185 lb)   Height: 5' 5\" (1.651 m)            Physical Exam  Vitals and nursing note reviewed. Constitutional:       General: She is not in acute distress. Appearance: Normal appearance. She is well-developed. She is not toxic-appearing. HENT:      Head: Normocephalic and atraumatic. Nose: Nose normal.      Mouth/Throat:      Mouth: Mucous membranes are moist.   Eyes:      General: Lids are normal.      Extraocular Movements: Extraocular movements intact. Conjunctiva/sclera: Conjunctivae normal.   Cardiovascular:      Rate and Rhythm: Normal rate and regular rhythm. Pulses: Normal pulses. Heart sounds: Normal heart sounds, S1 normal and S2 normal.   Pulmonary:      Effort: Pulmonary effort is normal. No accessory muscle usage. Breath sounds: Normal breath sounds. Abdominal:      Palpations: Abdomen is soft. Tenderness: There is no abdominal tenderness. There is no guarding or rebound. Genitourinary:     Comments: Exam deferred   Musculoskeletal:         General: Normal range of motion. Cervical back: Normal range of motion and neck supple. Skin:     General: Skin is warm and dry. Capillary Refill: Capillary refill takes less than 2 seconds. Neurological:      General: No focal deficit present. Mental Status: She is alert and oriented to person, place, and time. Mental status is at baseline. Psychiatric:         Attention and Perception: Attention normal.         Mood and Affect: Mood and affect normal.         Speech: Speech normal.         Behavior: Behavior is cooperative.          Thought Content: Thought content normal.         Cognition and Memory: Cognition normal.         Judgment: Judgment normal.        Medical Decision Making  Patient is 13 weeks 6 days gestation who presents with vaginal bleeding which started PTA. She is on lovenox prophylactically due to history of blood clot in prior pregnancy. VSS. H&H are stable. Ultrasound shows viable IUP, fhr 145bpm. Patient with threatened miscarriage. Discussed results with patient and need for close follow-up with her OBGYN for re-evaluation. Strict return to ER precautions discussed in detail. All questions addressed and answered. Amount and/or Complexity of Data Reviewed  Labs: ordered. Radiology: ordered. ECG/medicine tests: ordered.            Procedures

## 2023-02-07 NOTE — DISCHARGE INSTRUCTIONS
Call your OBGYN and schedule an appointment for close follow-up. If you develop worsening bleeding, light headedness, shortness of breath, and syncope - return to ER.

## 2023-02-09 LAB
ABO, EXTERNAL RESULT: NORMAL
HEP B, EXTERNAL RESULT: NEGATIVE
HIV, EXTERNAL RESULT: NEGATIVE
RH FACTOR, EXTERNAL RESULT: POSITIVE
RPR, EXTERNAL RESULT: NON REACTIVE
RUBELLA TITER, EXTERNAL RESULT: NORMAL

## 2023-03-20 ENCOUNTER — NURSE NAVIGATOR (OUTPATIENT)
Dept: PERINATAL CARE | Age: 38
End: 2023-03-20

## 2023-03-20 ENCOUNTER — HOSPITAL ENCOUNTER (OUTPATIENT)
Dept: PERINATAL CARE | Age: 38
Discharge: HOME | End: 2023-03-20
Attending: OBSTETRICS & GYNECOLOGY

## 2023-03-20 VITALS
HEART RATE: 89 BPM | SYSTOLIC BLOOD PRESSURE: 110 MMHG | HEIGHT: 65 IN | DIASTOLIC BLOOD PRESSURE: 73 MMHG | WEIGHT: 190 LBS | BODY MASS INDEX: 31.65 KG/M2

## 2023-03-22 NOTE — PROCEDURES
Indication  ========    History of DVT  Advanced maternal age  Class 1 obesity    Method  ======    Transabdominal ultrasound examination. View: Good view    Dating  ======    LMP on: 11/10/2022  GA by LMP 18 w + 4 d  ELIZA by LMP: 8/17/2023  Previous Ultrasound on: 1/12/2023  Type of prior assessment: GA  GA at prior assessment date 10 w + 2 d  GA by previous U/S 23 w + 6 d  ELIZA by previous Ultrasound: 8/8/2023  Ultrasound examination on: 3/20/2023  GA by U/S based upon: [facility], [place] by [facility], BPD, Femur, HC  GA by U/S 20 w + 2 d  ELIZA by U/S: 8/5/2023  Assigned: based on ultrasound (GA), selected on 01/26/2023  Assigned GA 19 w + 6 d  Assigned ELIZA: 8/8/2023    Fetal Biometry  ============    Standard  BPD 45.8 mm 19w 6d 49% Hadlock  OFD 63.2 mm 21w 4d 94% Manas  .1 mm 20w 0d 46% Hadlock  Cerebellum tr 20.4 mm 19w 4d 61% Hill  Nuchal fold 5.1 mm  .3 mm 20w 6d 77% Hadlock  Femur 32.6 mm 20w 1d 54% Hadlock  Humerus 30.4 mm 20w 0d 61% Manas   g 20w 3d 78% Hadlock  EFW (lb) 0 lb  EFW (oz) 13 oz  EFW by: Hadlock (BPD-HC-AC-FL)  Extended   7.6 mm  CM 4.4 mm  31% Nicolaides  Nasal bone 6.3 mm  Head / Face / Neck  Nasal bone: present  Other Structures   bpm    General Evaluation  ==============    Cardiac activity present.  bpm. Fetal movements: visualized. Presentation: breech  Placenta: Placental site: Fundal  Umbilical cord: Cord vessels: 3 vessel cord, normal insertion, 3 vessel cord.  Insertion site: normal insertion  Amniotic fluid: Amount of AF: normal amount    Fetal Anatomy  ===========    Cranium: normal  Lateral ventricles: normal  Choroid plexus: normal  Midline falx: normal  Cavum septi pellucidi: normal  Cerebellum: normal  Cisterna magna: normal  Head / Neck  Neck: normal  Lips: normal  Profile: normal  Nose: normal  Face  Coronal face: normal  Nasal bone: present  Palate: normal  Maxilla: normal  Mandible: normal  Orbits: normal  4-chamber view: normal  RVOT view: normal  LVOT view: normal  3-vessel view: normal  3-vessel-trachea view: normal  Heart / Thorax  Situs: situs solitus (normal)  Aortic arch view: normal  Bicaval view: normal  Ductal arch view: normal  Interventricular septum: normal  Diaphragm: normal  Cord insertion: normal  Stomach: normal  Kidneys: normal  Bladder: normal  Genitals: normal  Abdomen  Rt renal artery: normal  Lt renal artery: normal  Cervical spine: normal  Thoracic spine: normal  Lumbar spine: normal  Sacral spine: normal  Rt arm: normal  Lt arm: normal  Rt hand: normal  Rt fingers: normal  Lt hand: normal  Lt fingers: normal  Rt leg: normal  Lt leg: normal  Rt foot: normal  Rt toes: normal  Lt foot: normal  Lt toes: normal  Fetal sex: male  Wants to know fetal sex: yes    Maternal Structures  ===============    Ovaries / Tubes / Adnexa  Rt ovary: Normal  Rt ovary D1 2.0 cm  Rt ovary D2 2.2 cm  Rt ovary D3 2.1 cm  Rt ovary Vol 4.9 cm³  Lt ovary: Normal  Lt ovary D1 2.7 cm  Lt ovary D2 3.1 cm  Lt ovary D3 1.4 cm  Lt ovary Vol 5.9 cm³    Findings  =======    Detailed Fetal Anatomy (47175)    This is a almaraz pregnancy with biometry consistent with prior dating. Anatomy appears normal as noted above. Normal fluid and fetal movement are noted. Plan of Care  ==========    See Velarde had a DVT while taking OCPs. She is currently taking Lovenox 40 mg daily. 110/73    Patient was counseled on the findings. Risks/benefits of amniocentesis were reviewed which she declines. Questions and concerns were addressed. A total of 15 minutes was spent on this visit reviewing previous notes, counseling the patient and documenting the findings in the note.     Follow-up  ========    Growth in 4 weeks    Coding  ======    Code: G14.624  Description: Supervision of elderly multigravida  Code: I43.164  Description: Personal history of pulmonary embolism  Code: E66.09  Description: Other obesity due to excess calories  Code: 76668  Description: Ultrasound, pregnant uterus, real time with image documentation, fetal and maternal evaluation plus detailed fetal anatomic examination, transabdominal  approach;single or first gestation

## 2023-04-18 ENCOUNTER — HOSPITAL ENCOUNTER (OUTPATIENT)
Dept: PERINATAL CARE | Age: 38
Discharge: HOME | End: 2023-04-18
Attending: OBSTETRICS & GYNECOLOGY
Payer: COMMERCIAL

## 2023-04-18 PROCEDURE — 76816 OB US FOLLOW-UP PER FETUS: CPT | Performed by: OBSTETRICS & GYNECOLOGY

## 2023-04-18 NOTE — PROCEDURES
Indication  ========    History of DVT  Advanced maternal age    Method  ======    Transabdominal ultrasound examination. View: Sufficient    Pregnancy  =========    Ralph pregnancy. Number of fetuses: 1    Dating  ======    LMP on: 11/10/2022  GA by LMP 25 w + 5 d  ELIZA by LMP: 8/17/2023  Previous Ultrasound on: 1/12/2023  Type of prior assessment: GA  GA at prior assessment date 10 w + 2 d  GA by previous U/S 24 w + 0 d  ELIZA by previous Ultrasound: 8/8/2023  Ultrasound examination on: 4/18/2023  GA by U/S based upon: [facility], BPD, Femur, HC  GA by U/S 25 w + 0 d  ELIZA by U/S: 8/1/2023  Assigned: based on ultrasound (GA), selected on 01/26/2023  Assigned GA 24 w + 0 d  Assigned ELIZA: 8/8/2023    Fetal Biometry  ============    Standard  BPD 61.1 mm 24w 6d 74% Hadlock  OFD 82.3 mm 26w 5d >99% Manas  .7 mm 25w 0d 70% Hadlock  .9 mm 25w 2d 81% Hadlock  Femur 45.2 mm 25w 0d 68% Hadlock   g 24w 6d 88% Hadlock  EFW (lb) 1 lb  EFW (oz) 11 oz  EFW by: Hadlock (BPD-HC-AC-FL)  Extended   6.4 mm  Other Structures   bpm    General Evaluation  ==============    Cardiac activity present.  bpm. Fetal movements: visualized. Presentation: cephalic  Placenta: Placental site: anterior  Umbilical cord: Cord vessels: 3 vessel cord, normal insertion  Amniotic fluid: Amount of AF: normal amount. MVP 6.2 cm    Fetal Anatomy  ===========    Cranium: normal  Lateral ventricles: normal  Cerebellum: normal  Lips: normal  Nose: normal  4-chamber view: normal  RVOT view: normal  LVOT view: normal  3-vessel view: normal  3-vessel-trachea view: normal  Cord insertion: normal  Stomach: normal  Kidneys: normal  Bladder: normal  Fetal sex: male  Wants to know fetal sex: yes    Findings  =======    Follow up ultrasound (35902)    This is a ralph pregnancy with biometry consistent with prior dating. Anatomy appears normal as noted above. Normal fluid and fetal movement are noted.     Growth is appropriate for gestational age at 88%. Plan of Care  ==========    Georgia Lawson had a DVT while taking OCPs. She is currently taking Lovenox 40 mg daily.     Follow-up  ========    Follow up at 32 weeks gestation for fetal growth    Coding  ======    Code: O09.522  Description: Supervision of elderly multigravida  Code: K15.700  Description: Personal history of pulmonary embolism  Code: 81543  Description: Ultrasound, pregnant uterus, real time with image documentation, follow up, transabdominal approach per fetus

## 2023-06-01 ENCOUNTER — HOSPITAL ENCOUNTER (OUTPATIENT)
Facility: HOSPITAL | Age: 38
Discharge: HOME | End: 2023-06-01
Attending: OBSTETRICS & GYNECOLOGY | Admitting: OBSTETRICS & GYNECOLOGY
Payer: COMMERCIAL

## 2023-06-01 VITALS
WEIGHT: 196 LBS | TEMPERATURE: 97.8 F | RESPIRATION RATE: 18 BRPM | DIASTOLIC BLOOD PRESSURE: 60 MMHG | BODY MASS INDEX: 32.65 KG/M2 | HEIGHT: 65 IN | HEART RATE: 84 BPM | SYSTOLIC BLOOD PRESSURE: 112 MMHG | OXYGEN SATURATION: 96 %

## 2023-06-01 LAB
APPEARANCE UR: CLEAR
BACTERIA URNS QL MICRO: NEGATIVE /HPF
BILIRUB UR QL: NEGATIVE
COLOR UR: ABNORMAL
EPITH CASTS URNS QL MICRO: ABNORMAL /LPF
GLUCOSE UR STRIP.AUTO-MCNC: NEGATIVE MG/DL
HGB UR QL STRIP: NEGATIVE
HYALINE CASTS URNS QL MICRO: ABNORMAL /LPF (ref 0–2)
KETONES UR QL STRIP.AUTO: NEGATIVE MG/DL
LEUKOCYTE ESTERASE UR QL STRIP.AUTO: ABNORMAL
NITRITE UR QL STRIP.AUTO: NEGATIVE
PH UR STRIP: 7.5 (ref 5–8)
PROT UR STRIP-MCNC: NEGATIVE MG/DL
RBC #/AREA URNS HPF: ABNORMAL /HPF (ref 0–5)
SP GR UR REFRACTOMETRY: 1.01 (ref 1–1.03)
URINE CULTURE IF INDICATED: ABNORMAL
UROBILINOGEN UR QL STRIP.AUTO: 0.2 EU/DL (ref 0.2–1)
WBC URNS QL MICRO: ABNORMAL /HPF (ref 0–4)

## 2023-06-01 PROCEDURE — 6370000000 HC RX 637 (ALT 250 FOR IP): Performed by: OBSTETRICS & GYNECOLOGY

## 2023-06-01 PROCEDURE — 2580000003 HC RX 258: Performed by: OBSTETRICS & GYNECOLOGY

## 2023-06-01 PROCEDURE — 6360000002 HC RX W HCPCS: Performed by: OBSTETRICS & GYNECOLOGY

## 2023-06-01 PROCEDURE — 87086 URINE CULTURE/COLONY COUNT: CPT

## 2023-06-01 PROCEDURE — 4500000002 HC ER NO CHARGE

## 2023-06-01 PROCEDURE — 81001 URINALYSIS AUTO W/SCOPE: CPT

## 2023-06-01 PROCEDURE — 99284 EMERGENCY DEPT VISIT MOD MDM: CPT

## 2023-06-01 RX ORDER — POLYETHYLENE GLYCOL 3350 17 G/17G
17 POWDER, FOR SOLUTION ORAL DAILY
Status: DISCONTINUED | OUTPATIENT
Start: 2023-06-01 | End: 2023-06-02 | Stop reason: HOSPADM

## 2023-06-01 RX ORDER — ENOXAPARIN SODIUM 300 MG/3ML
40 INJECTION INTRAVENOUS; SUBCUTANEOUS DAILY
COMMUNITY

## 2023-06-01 RX ORDER — POLYETHYLENE GLYCOL 3350 17 G/17G
17 POWDER, FOR SOLUTION ORAL DAILY
Status: DISCONTINUED | OUTPATIENT
Start: 2023-06-02 | End: 2023-06-01

## 2023-06-01 RX ORDER — SODIUM CHLORIDE, SODIUM LACTATE, POTASSIUM CHLORIDE, CALCIUM CHLORIDE 600; 310; 30; 20 MG/100ML; MG/100ML; MG/100ML; MG/100ML
INJECTION, SOLUTION INTRAVENOUS CONTINUOUS
Status: DISCONTINUED | OUTPATIENT
Start: 2023-06-01 | End: 2023-06-02 | Stop reason: HOSPADM

## 2023-06-01 RX ADMIN — POLYETHYLENE GLYCOL 3350 17 G: 17 POWDER, FOR SOLUTION ORAL at 23:30

## 2023-06-01 RX ADMIN — CEFAZOLIN 1000 MG: 1 INJECTION, POWDER, FOR SOLUTION INTRAMUSCULAR; INTRAVENOUS at 23:30

## 2023-06-02 NOTE — ED NOTES
AntePartum High Risk Pregnancy Note    Rocael Jason  30w2d  HPI  Patient is a 31 y/o F  admitted for  contractions every 3 minutes at  30w2d . Estimated Date of Delivery: 23 . She also has urinary frequency. Denies back pain, chills, fever, nausea, PROM, or vaginal bleeding. Vitals:  Patient Vitals for the past 24 hrs:   BP Temp Temp src Pulse Resp SpO2 Height Weight   23 2140 -- -- -- -- -- 96 % -- --   23 2135 -- -- -- -- -- 96 % -- --   23 2132 112/60 97.8 °F (36.6 °C) Oral 84 18 96 % 1.651 m (5' 5\") 88.9 kg (196 lb)     Temp (24hrs), Av.8 °F (36.6 °C), Min:97.8 °F (36.6 °C), Max:97.8 °F (36.6 °C)    I&O:   No intake/output data recorded. No intake/output data recorded. Exam:  Patient without distress. Abdomen soft, non tender, frequent contractions               Fundus soft and non tender               Right upper quadrant  non tender               Perineum No sign of blood or amniotic fluid               Lower extremities edema No                 Dilation: 0 cm     Effacement: Long  Not Checked    Station: -3     Uterine Activity: Frequency: Every 3 minutes               NST:  Reactive with multiple 15 beat accelerations           Labs: moderate leukocyte esterase    Assessment and Plan:     contractions at 30 weeks-resolved  Dehydration in pregnancy. 3. Acute UTI in pregnancy. Plan: 1 IV hydration. 2 Ancef 1gm IV. 3 Discharge home.

## 2023-06-03 LAB
BACTERIA SPEC CULT: NORMAL
CC UR VC: NORMAL
SERVICE CMNT-IMP: NORMAL

## 2023-06-22 ENCOUNTER — HOSPITAL ENCOUNTER (OUTPATIENT)
Facility: HOSPITAL | Age: 38
End: 2023-06-22
Payer: COMMERCIAL

## 2023-06-22 PROCEDURE — 76816 OB US FOLLOW-UP PER FETUS: CPT

## 2023-06-22 PROCEDURE — 76819 FETAL BIOPHYS PROFIL W/O NST: CPT

## 2023-06-22 NOTE — PROCEDURES
Indication  ========    History of DVT  Advanced maternal age    Method  ======    Transabdominal ultrasound examination. View: Suboptimal view: limited by late gestational age    Pregnancy  =========    Ford pregnancy. Number of fetuses: 1    Dating  ======    LMP on: 11/10/2022  GA by LMP 28 w + 0 d  CHRISTOPHER by LMP: 8/17/2023  Previous Ultrasound on: 1/12/2023  Type of prior assessment: GA  GA at prior assessment date 10 w + 2 d  GA by previous U/S 35 w + 2 d  CHRISTOPHER by previous Ultrasound: 8/8/2023  Ultrasound examination on: 6/22/2023  GA by U/S based upon: [facility], BPD, Femur, HC  GA by U/S 28 w + 2 d  CHRISTOPHER by U/S: 7/25/2023  Assigned: based on ultrasound (GA), selected on 01/26/2023  Assigned GA 33 w + 2 d  Assigned CHRISTOPHER: 8/8/2023    Fetal Biometry  ============    Standard  BPD 87.9 mm 35w 4d 94% Hadlock  .1 mm 38w 0d >99% Don  .4 mm 36w 1d 84% Hadlock  .4 mm 34w 6d 88% Hadlock  Femur 67.1 mm 34w 4d 72% Hadlock  EFW 2,549 g 34w 6d 87% Hadlock  EFW (lb) 5 lb  EFW (oz) 10 oz  EFW by: Hadlock (BPD-HC-AC-FL)  Extended   7.3 mm  Other Structures   bpm    General Evaluation  ==============    Cardiac activity present.  bpm. Fetal movements: visualized. Presentation: cephalic  Placenta: Placental site: anterior  Umbilical cord: Cord vessels: 3 vessel cord, normal insertion  Amniotic fluid: MVP 6.7 cm. ELMO 22.9 cm. Q1 6.6 cm, Q2 6.7 cm, Q3 6.5 cm, Q4 3.2 cm    Fetal Anatomy  ===========    Cranium: normal  Lateral ventricles: normal  Stomach: normal  Kidneys: normal  Bladder: normal  Fetal sex: male  Wants to know fetal sex: yes    Biophysical Profile  ==============    2: Fetal breathing movements  2: Gross body movements  2: Fetal tone  2: Amniotic fluid volume  8/8 Biophysical profile score  Incidental BPP 8/8    Findings  =======    Follow up ultrasound (80525)    This is a ford pregnancy with biometry consistent with prior dating. Anatomy appears normal as noted above.  Normal

## 2023-07-12 LAB — GBS, EXTERNAL RESULT: NEGATIVE

## 2023-07-13 ENCOUNTER — HOSPITAL ENCOUNTER (OUTPATIENT)
Facility: HOSPITAL | Age: 38
End: 2023-07-13
Payer: COMMERCIAL

## 2023-07-13 PROCEDURE — 76819 FETAL BIOPHYS PROFIL W/O NST: CPT

## 2023-07-13 NOTE — PROCEDURES
PATIENT: Bibi Rioz   -  : 1985   -  DOS:2023   -  INTERPRETING PROVIDER:Jose Maria Clarke,   Indication  ========    History of DVT  Advanced maternal age    Method  ======    Transabdominal ultrasound examination. View: Sufficient    Pregnancy  =========    Ford pregnancy. Number of fetuses: 1    Dating  ======    LMP on: 11/10/2022  GA by LMP 28 w + 0 d  CHRISTOPHER by LMP: 2023  Previous Ultrasound on: 2023  Type of prior assessment: GA  GA at prior assessment date 10 w + 2 d  GA by previous U/S 39 w + 2 d  CHRISTOPHER by previous Ultrasound: 2023  Assigned: based on ultrasound (GA), selected on 2023  Assigned GA 36 w + 2 d  Assigned CHRISTOPHER: 2023    General Evaluation  ==============    Cardiac activity present.  bpm. Fetal movements: visualized. Presentation: cephalic  Placenta: Placental site: anterior  Umbilical cord: Cord vessels: 3 vessel cord, normal insertion    Amniotic Fluid Assessment  =====================    Amount of AF: normal amount  MVP 5.6 cm. ELMO 19.6 cm. Q1 5.6 cm, Q2 5.6 cm, Q3 4.9 cm, Q4 3.5 cm    Biophysical Profile  ==============    2: Fetal breathing movements  2: Gross body movements  2: Fetal tone  2: Amniotic fluid volume   Biophysical profile score    Findings  =======    Biophysical Profile without NST (03681)    Please see biophysical profile score noted above. Fetal movement and fluid volume appear normal.    Plan of Care  ==========    Maira Brady had a DVT while taking OCPs. She is currently taking Lovenox 40 mg daily. She declined genetic testing. Maira Brady was switched to heparin by her obstetrician. She has trouble giving herself shots and is nervous doing them twice a day rather than once a day. We discussed that  she can continue with once daily Lovenox injections until delivery if she is more comfortable. We discussed that she cannot have an epidural unless it has been 12 hours  since her last dose of Lovenox.  If she is having

## 2023-07-20 ENCOUNTER — HOSPITAL ENCOUNTER (OUTPATIENT)
Facility: HOSPITAL | Age: 38
Discharge: HOME | End: 2023-07-20
Payer: COMMERCIAL

## 2023-07-20 PROCEDURE — 76819 FETAL BIOPHYS PROFIL W/O NST: CPT

## 2023-07-20 PROCEDURE — 76816 OB US FOLLOW-UP PER FETUS: CPT

## 2023-07-27 ENCOUNTER — HOSPITAL ENCOUNTER (OUTPATIENT)
Facility: HOSPITAL | Age: 38
Discharge: HOME | End: 2023-07-27
Payer: COMMERCIAL

## 2023-07-27 PROCEDURE — 76819 FETAL BIOPHYS PROFIL W/O NST: CPT | Performed by: OBSTETRICS & GYNECOLOGY

## 2023-07-27 NOTE — PROCEDURES
PATIENT: Rosi Valdez   -  : 1985   -  DOS:2023   -  INTERPRETING PROVIDER:Jocelynn Yu,   Indication  ========    History of DVT  Advanced maternal age    Maternal Assessment  =================    BP syst 106 mmHg  BP diast 66 mmHg    Method  ======    Transabdominal ultrasound examination. View: Sufficient    Pregnancy  =========    Ford pregnancy. Number of fetuses: 1    Dating  ======    LMP on: 11/10/2022  GA by LMP 39 w + 0 d  CHRISTOPHER by LMP: 2023  Previous Ultrasound on: 2023  Type of prior assessment: GA  GA at prior assessment date 10 w + 2 d  GA by previous U/S 40 w + 2 d  CHRISTOPHER by previous Ultrasound: 2023  Ultrasound examination on: 2023  GA by U/S based upon: [facility], BPD, Femur, HC  GA by U/S 44 w + 2 d  CHRISTOPHER by U/S: 2023  Assigned: based on the LMP, selected on 2023  Assigned GA 36 w + 0 d  Assigned CHRISTOPHER: 2023    Fetal Biometry  ============    Standard  BPD 94.5 mm 38w 4d 98% Hadlock  .5 mm -/- >99% Don  .7 mm 40w 6d 99% Hadlock  .1 mm 39w 6d >99% Hadlock  Femur 73.7 mm 37w 5d 85% Hadlock  Humerus 62.5 mm 36w 1d 73% Don  EFW 3,771 g -/- >99% Hadlock  EFW (lb) 8 lb  EFW (oz) 5 oz  EFW by: Hadlock (BPD-HC-AC-FL)  Other Structures   bpm    General Evaluation  ==============    Cardiac activity present.  bpm. Fetal movements: visualized. Presentation: cephalic  Placenta: anterior  Umbilical cord: Cord vessels: 3 vessel cord, normal insertion  Amniotic fluid: MVP 7.1 cm. ELMO 22.4 cm.  Q1 5.4 cm, Q2 5.9 cm, Q3 4.0 cm, Q4 7.1 cm    Fetal Anatomy  ===========    4-chamber view: normal  Cord insertion: normal  Stomach: normal  Kidneys: normal  Bladder: normal  Fetal sex: male  Wants to know fetal sex: yes    Biophysical Profile  ==============    2: Fetal breathing movements  2: Gross body movements  2: Fetal tone  2: Amniotic fluid volume  8/8 Biophysical profile score    Fetal Doppler  ===========    Arterial  Umbilical

## 2023-07-27 NOTE — PROCEDURES
PATIENT: Concha Lancaster   -  : 1985   -  DOS:2023   -  INTERPRETING PROVIDER:Wanda Clarke,   Indication  ========    History of DVT  Advanced maternal age    Method  ======    Transabdominal ultrasound examination. View: Good view    Pregnancy  =========    Ford pregnancy. Number of fetuses: 1    Dating  ======    LMP on: 11/10/2022  GA by LMP 40 w + 0 d  CHRISTOPHER by LMP: 2023  Previous Ultrasound on: 2023  Type of prior assessment: GA  GA at prior assessment date 10 w + 3 d  GA by previous U/S 45 w + 3 d  CHRISTOPHER by previous Ultrasound: 2023  Assigned: based on ultrasound (GA), selected on 2023  Assigned GA 38 w + 3 d  Assigned CHRISTOPHER: 2023    General Evaluation  ==============    Cardiac activity present.  bpm. Fetal movements: visualized. Presentation: cephalic  Placenta: anterior  Umbilical cord: Cord vessels: 3 vessel cord, normal insertion    Amniotic Fluid Assessment  =====================    Amount of AF: normal amount  MVP 4.4 cm. ELMO 10.8 cm. Q1 3.5 cm, Q2 0.0 cm, Q3 4.4 cm, Q4 2.8 cm    Biophysical Profile  ==============    2: Fetal breathing movements  2: Gross body movements  2: Fetal tone  2: Amniotic fluid volume   Biophysical profile score    Findings  =======    Biophysical Profile without NST ([id number])    Please see biophysical profile score noted above. Fetal movement and fluid volume appear normal.    Plan of Care  ==========    Kecia Mason had a DVT while taking OCPs. She is currently taking Lovenox 40 mg daily. She declined genetic testing. Kecia Mason is scheduled for induction of labor next week. I recommend that she take her last dose of Lovenox the evening before her induction. She will need to resume her  Lovenox postpartum and continue for 6 weeks.     Follow-up  ========    Follow up as clinically indicated    Coding  ======    Code: O09.523  Description: Supervision of elderly multigravida  Code: Z86.711  Description: Personal history of

## 2023-08-01 ENCOUNTER — HOSPITAL ENCOUNTER (INPATIENT)
Facility: HOSPITAL | Age: 38
LOS: 3 days | Discharge: HOME | End: 2023-08-04
Attending: OBSTETRICS & GYNECOLOGY | Admitting: OBSTETRICS & GYNECOLOGY
Payer: COMMERCIAL

## 2023-08-01 PROBLEM — O99.891 OTHER SPECIFIED DISEASES AND CONDITIONS COMPLICATING PREGNANCY: Status: ACTIVE | Noted: 2023-08-01

## 2023-08-01 PROBLEM — D50.8 OTHER IRON DEFICIENCY ANEMIAS: Status: ACTIVE | Noted: 2023-08-01

## 2023-08-01 PROBLEM — Z3A.39 39 WEEKS GESTATION OF PREGNANCY: Status: ACTIVE | Noted: 2023-08-01

## 2023-08-01 PROBLEM — Z86.718 PERSONAL HISTORY OF DVT (DEEP VEIN THROMBOSIS): Status: ACTIVE | Noted: 2023-08-01

## 2023-08-01 LAB
ABO + RH BLD: NORMAL
BASOPHILS # BLD: 0 K/UL (ref 0–0.1)
BASOPHILS NFR BLD: 0 % (ref 0–1)
BLOOD GROUP ANTIBODIES SERPL: NORMAL
DIFFERENTIAL METHOD BLD: ABNORMAL
EOSINOPHIL # BLD: 0.1 K/UL (ref 0–0.4)
EOSINOPHIL NFR BLD: 1 % (ref 0–7)
ERYTHROCYTE [DISTWIDTH] IN BLOOD BY AUTOMATED COUNT: 15.9 % (ref 11.5–14.5)
HCT VFR BLD AUTO: 31.1 % (ref 35–47)
HGB BLD-MCNC: 9.8 G/DL (ref 11.5–16)
IMM GRANULOCYTES # BLD AUTO: 0.1 K/UL (ref 0–0.04)
IMM GRANULOCYTES NFR BLD AUTO: 2 % (ref 0–0.5)
LYMPHOCYTES # BLD: 1.6 K/UL (ref 0.8–3.5)
LYMPHOCYTES NFR BLD: 18 % (ref 12–49)
MCH RBC QN AUTO: 26 PG (ref 26–34)
MCHC RBC AUTO-ENTMCNC: 31.5 G/DL (ref 30–36.5)
MCV RBC AUTO: 82.5 FL (ref 80–99)
MONOCYTES # BLD: 0.7 K/UL (ref 0–1)
MONOCYTES NFR BLD: 8 % (ref 5–13)
NEUTS SEG # BLD: 6.4 K/UL (ref 1.8–8)
NEUTS SEG NFR BLD: 71 % (ref 32–75)
NRBC # BLD: 0 K/UL (ref 0–0.01)
NRBC BLD-RTO: 0 PER 100 WBC
PLATELET # BLD AUTO: 295 K/UL (ref 150–400)
PMV BLD AUTO: 11 FL (ref 8.9–12.9)
RBC # BLD AUTO: 3.77 M/UL (ref 3.8–5.2)
SPECIMEN EXP DATE BLD: NORMAL
WBC # BLD AUTO: 8.9 K/UL (ref 3.6–11)

## 2023-08-01 PROCEDURE — 36415 COLL VENOUS BLD VENIPUNCTURE: CPT

## 2023-08-01 PROCEDURE — 94761 N-INVAS EAR/PLS OXIMETRY MLT: CPT

## 2023-08-01 PROCEDURE — 1100000000 HC RM PRIVATE

## 2023-08-01 PROCEDURE — 7210000100 HC LABOR FEE PER 1 HR: Performed by: OBSTETRICS & GYNECOLOGY

## 2023-08-01 PROCEDURE — 86901 BLOOD TYPING SEROLOGIC RH(D): CPT

## 2023-08-01 PROCEDURE — 86900 BLOOD TYPING SEROLOGIC ABO: CPT

## 2023-08-01 PROCEDURE — 85025 COMPLETE CBC W/AUTO DIFF WBC: CPT

## 2023-08-01 PROCEDURE — 59200 INSERT CERVICAL DILATOR: CPT | Performed by: OBSTETRICS & GYNECOLOGY

## 2023-08-01 PROCEDURE — 86850 RBC ANTIBODY SCREEN: CPT

## 2023-08-01 RX ORDER — METHYLERGONOVINE MALEATE 0.2 MG/ML
200 INJECTION INTRAVENOUS PRN
Status: DISCONTINUED | OUTPATIENT
Start: 2023-08-01 | End: 2023-08-02 | Stop reason: SDUPTHER

## 2023-08-01 RX ORDER — TRANEXAMIC ACID 10 MG/ML
1000 INJECTION, SOLUTION INTRAVENOUS
Status: ACTIVE | OUTPATIENT
Start: 2023-08-01 | End: 2023-08-02

## 2023-08-01 RX ORDER — DOCUSATE SODIUM 100 MG/1
100 CAPSULE, LIQUID FILLED ORAL 2 TIMES DAILY
Status: DISCONTINUED | OUTPATIENT
Start: 2023-08-01 | End: 2023-08-02 | Stop reason: SDUPTHER

## 2023-08-01 RX ORDER — ONDANSETRON 2 MG/ML
4 INJECTION INTRAMUSCULAR; INTRAVENOUS EVERY 6 HOURS PRN
Status: DISCONTINUED | OUTPATIENT
Start: 2023-08-01 | End: 2023-08-02 | Stop reason: SDUPTHER

## 2023-08-01 RX ORDER — SODIUM CHLORIDE 9 MG/ML
25 INJECTION, SOLUTION INTRAVENOUS PRN
Status: DISCONTINUED | OUTPATIENT
Start: 2023-08-01 | End: 2023-08-02 | Stop reason: SDUPTHER

## 2023-08-01 RX ORDER — SODIUM CHLORIDE, SODIUM LACTATE, POTASSIUM CHLORIDE, AND CALCIUM CHLORIDE .6; .31; .03; .02 G/100ML; G/100ML; G/100ML; G/100ML
500 INJECTION, SOLUTION INTRAVENOUS PRN
Status: DISCONTINUED | OUTPATIENT
Start: 2023-08-01 | End: 2023-08-04 | Stop reason: HOSPADM

## 2023-08-01 RX ORDER — CARBOPROST TROMETHAMINE 250 UG/ML
250 INJECTION, SOLUTION INTRAMUSCULAR PRN
Status: DISCONTINUED | OUTPATIENT
Start: 2023-08-01 | End: 2023-08-04 | Stop reason: HOSPADM

## 2023-08-01 RX ORDER — SODIUM CHLORIDE 0.9 % (FLUSH) 0.9 %
5-40 SYRINGE (ML) INJECTION EVERY 12 HOURS SCHEDULED
Status: DISCONTINUED | OUTPATIENT
Start: 2023-08-01 | End: 2023-08-02 | Stop reason: SDUPTHER

## 2023-08-01 RX ORDER — SODIUM CHLORIDE, SODIUM LACTATE, POTASSIUM CHLORIDE, AND CALCIUM CHLORIDE .6; .31; .03; .02 G/100ML; G/100ML; G/100ML; G/100ML
1000 INJECTION, SOLUTION INTRAVENOUS PRN
Status: DISCONTINUED | OUTPATIENT
Start: 2023-08-01 | End: 2023-08-04 | Stop reason: HOSPADM

## 2023-08-01 RX ORDER — MISOPROSTOL 200 UG/1
800 TABLET ORAL PRN
Status: DISCONTINUED | OUTPATIENT
Start: 2023-08-01 | End: 2023-08-02 | Stop reason: SDUPTHER

## 2023-08-01 RX ORDER — SODIUM CHLORIDE, SODIUM LACTATE, POTASSIUM CHLORIDE, CALCIUM CHLORIDE 600; 310; 30; 20 MG/100ML; MG/100ML; MG/100ML; MG/100ML
INJECTION, SOLUTION INTRAVENOUS CONTINUOUS
Status: DISCONTINUED | OUTPATIENT
Start: 2023-08-01 | End: 2023-08-02 | Stop reason: SDUPTHER

## 2023-08-01 RX ORDER — SODIUM CHLORIDE 0.9 % (FLUSH) 0.9 %
5-40 SYRINGE (ML) INJECTION PRN
Status: DISCONTINUED | OUTPATIENT
Start: 2023-08-01 | End: 2023-08-02 | Stop reason: SDUPTHER

## 2023-08-01 NOTE — PROGRESS NOTES
1845: Bedside and Verbal shift change report given to Christi Tobar RN (oncoming nurse) by Missy Tam, LAVERNE (offgoing nurse). Report included the following information Nurse Handoff Report, Intake/Output, MAR, Recent Results, and Quality Measures. 1903: Reactive NST. Patient reports occasional cramping, denies pain. Patient removed from monitor to use restroom. 1934: Margarita Hobbs MD at bedside. 1940: Cook Catheter placed, 80/80. Patient tolerated well. VORB to remove patient from [facility] until starting Oxytocin at 0400, per Margarita Hobbs MD.    2029: Patient removed from monitor. Patient states she feels contractions, but denies pain. Will alert nursing for any needs. FOB sleeping at bedside. 2059: This RN updating [facility] CNM of patient status. 8180: Patient placed on EFM. 26: RN remains at bedside. Fetal monitoring difficult due to fetal movement and activity. Patient abdomen visibly moving. 0430: This RN assessing Cook Catheter and applying gentle traction. Catheter remains in place. 2398: This RN at bedside adjusting EFM due to difficulty monitoring. 0: Duty RN at bedside adjusting EFM due to difficulty monitoring. 2148: This RN at bedside adjusting EFM due to difficulty monitoring. 3910: Sarah RN at bedside adjusting EFM due to difficulty monitoring. 0700: Bedside and Verbal shift change report given to SERGEY Trejo RN (oncoming nurse) by Vale Mitchell RN (offgoing nurse). Report included the following information Nurse Handoff Report, Intake/Output, MAR, Recent Results, and Quality Measures.

## 2023-08-01 NOTE — PROGRESS NOTES
1610: Pt arrives ambulatory for scheduled induction for history of a DVT. Pt was taking lovenox but states she stopped taking it on Sunday. Pt denies any other complications during this pregnancy. Pt has fetal movement and denies any loss of fluid or vaginal bleeding. 1635: Pt placed on EFM at this time. 21 : MD Huynh at bedside to see patient, vertex position confirmed via ultrasound d/t difficulty with FHT. 1900: Bedside and Verbal shift change report given to Jarod Moore RN (oncoming nurse) by Brisa Arevalo RN (offgoing nurse). Report included the following information Nurse Handoff Report, Adult Overview, Intake/Output, MAR, and Recent Results.

## 2023-08-02 ENCOUNTER — ANESTHESIA (OUTPATIENT)
Facility: HOSPITAL | Age: 38
End: 2023-08-02
Payer: COMMERCIAL

## 2023-08-02 ENCOUNTER — ANESTHESIA EVENT (OUTPATIENT)
Facility: HOSPITAL | Age: 38
End: 2023-08-02
Payer: COMMERCIAL

## 2023-08-02 PROCEDURE — 6360000002 HC RX W HCPCS: Performed by: ANESTHESIOLOGY

## 2023-08-02 PROCEDURE — 0KQM0ZZ REPAIR PERINEUM MUSCLE, OPEN APPROACH: ICD-10-PCS | Performed by: OBSTETRICS & GYNECOLOGY

## 2023-08-02 PROCEDURE — 6360000002 HC RX W HCPCS: Performed by: OBSTETRICS & GYNECOLOGY

## 2023-08-02 PROCEDURE — 00HU33Z INSERTION OF INFUSION DEVICE INTO SPINAL CANAL, PERCUTANEOUS APPROACH: ICD-10-PCS | Performed by: ANESTHESIOLOGY

## 2023-08-02 PROCEDURE — 2580000003 HC RX 258: Performed by: OBSTETRICS & GYNECOLOGY

## 2023-08-02 PROCEDURE — 2500000003 HC RX 250 WO HCPCS: Performed by: ANESTHESIOLOGY

## 2023-08-02 PROCEDURE — 6370000000 HC RX 637 (ALT 250 FOR IP): Performed by: OBSTETRICS & GYNECOLOGY

## 2023-08-02 PROCEDURE — 7220000101 HC DELIVERY VAGINAL/SINGLE: Performed by: OBSTETRICS & GYNECOLOGY

## 2023-08-02 PROCEDURE — 94761 N-INVAS EAR/PLS OXIMETRY MLT: CPT

## 2023-08-02 PROCEDURE — 1100000000 HC RM PRIVATE

## 2023-08-02 PROCEDURE — 98960 EDU&TRN PT SELF-MGMT NQHP 1: CPT

## 2023-08-02 PROCEDURE — 3E033VJ INTRODUCTION OF OTHER HORMONE INTO PERIPHERAL VEIN, PERCUTANEOUS APPROACH: ICD-10-PCS | Performed by: OBSTETRICS & GYNECOLOGY

## 2023-08-02 PROCEDURE — 7210000100 HC LABOR FEE PER 1 HR: Performed by: OBSTETRICS & GYNECOLOGY

## 2023-08-02 PROCEDURE — 3700000156 HC EPIDURAL ANESTHESIA: Performed by: ANESTHESIOLOGY

## 2023-08-02 RX ORDER — ACETAMINOPHEN 325 MG/1
650 TABLET ORAL EVERY 4 HOURS PRN
Status: DISCONTINUED | OUTPATIENT
Start: 2023-08-02 | End: 2023-08-04 | Stop reason: HOSPADM

## 2023-08-02 RX ORDER — OXYCODONE HYDROCHLORIDE 5 MG/1
5 TABLET ORAL EVERY 4 HOURS PRN
Status: DISCONTINUED | OUTPATIENT
Start: 2023-08-02 | End: 2023-08-04 | Stop reason: HOSPADM

## 2023-08-02 RX ORDER — FENTANYL 0.2 MG/100ML-BUPIV 0.125%-NACL 0.9% EPIDURAL INJ 2/0.125 MCG/ML-%
10 SOLUTION INJECTION CONTINUOUS
Status: DISCONTINUED | OUTPATIENT
Start: 2023-08-02 | End: 2023-08-02 | Stop reason: SDUPTHER

## 2023-08-02 RX ORDER — BISACODYL 10 MG
10 SUPPOSITORY, RECTAL RECTAL DAILY PRN
Status: DISCONTINUED | OUTPATIENT
Start: 2023-08-02 | End: 2023-08-04 | Stop reason: HOSPADM

## 2023-08-02 RX ORDER — IBUPROFEN 800 MG/1
800 TABLET ORAL EVERY 8 HOURS PRN
Status: DISCONTINUED | OUTPATIENT
Start: 2023-08-02 | End: 2023-08-04 | Stop reason: HOSPADM

## 2023-08-02 RX ORDER — SIMETHICONE 80 MG
80 TABLET,CHEWABLE ORAL EVERY 6 HOURS PRN
Status: DISCONTINUED | OUTPATIENT
Start: 2023-08-02 | End: 2023-08-04 | Stop reason: HOSPADM

## 2023-08-02 RX ORDER — LANOLIN/MINERAL OIL
LOTION (ML) TOPICAL PRN
Status: DISCONTINUED | OUTPATIENT
Start: 2023-08-02 | End: 2023-08-04 | Stop reason: HOSPADM

## 2023-08-02 RX ORDER — DOCUSATE SODIUM 100 MG/1
100 CAPSULE, LIQUID FILLED ORAL 2 TIMES DAILY
Status: DISCONTINUED | OUTPATIENT
Start: 2023-08-02 | End: 2023-08-04 | Stop reason: HOSPADM

## 2023-08-02 RX ORDER — ONDANSETRON 2 MG/ML
4 INJECTION INTRAMUSCULAR; INTRAVENOUS EVERY 6 HOURS PRN
Status: DISCONTINUED | OUTPATIENT
Start: 2023-08-02 | End: 2023-08-02 | Stop reason: SDUPTHER

## 2023-08-02 RX ORDER — SODIUM CHLORIDE 0.9 % (FLUSH) 0.9 %
5-40 SYRINGE (ML) INJECTION PRN
Status: DISCONTINUED | OUTPATIENT
Start: 2023-08-02 | End: 2023-08-04 | Stop reason: HOSPADM

## 2023-08-02 RX ORDER — SODIUM CHLORIDE 0.9 % (FLUSH) 0.9 %
5-40 SYRINGE (ML) INJECTION EVERY 12 HOURS SCHEDULED
Status: DISCONTINUED | OUTPATIENT
Start: 2023-08-02 | End: 2023-08-04 | Stop reason: HOSPADM

## 2023-08-02 RX ORDER — FERROUS SULFATE 325(65) MG
325 TABLET ORAL 2 TIMES DAILY WITH MEALS
Status: DISCONTINUED | OUTPATIENT
Start: 2023-08-03 | End: 2023-08-04 | Stop reason: HOSPADM

## 2023-08-02 RX ORDER — ENOXAPARIN SODIUM 100 MG/ML
40 INJECTION SUBCUTANEOUS DAILY
Status: DISCONTINUED | OUTPATIENT
Start: 2023-08-03 | End: 2023-08-04 | Stop reason: HOSPADM

## 2023-08-02 RX ORDER — TRANEXAMIC ACID 10 MG/ML
1000 INJECTION, SOLUTION INTRAVENOUS
Status: ACTIVE | OUTPATIENT
Start: 2023-08-02 | End: 2023-08-03

## 2023-08-02 RX ORDER — OXYCODONE HYDROCHLORIDE 5 MG/1
10 TABLET ORAL EVERY 4 HOURS PRN
Status: DISCONTINUED | OUTPATIENT
Start: 2023-08-02 | End: 2023-08-04 | Stop reason: HOSPADM

## 2023-08-02 RX ORDER — ONDANSETRON 2 MG/ML
4 INJECTION INTRAMUSCULAR; INTRAVENOUS EVERY 6 HOURS PRN
Status: DISCONTINUED | OUTPATIENT
Start: 2023-08-02 | End: 2023-08-04 | Stop reason: HOSPADM

## 2023-08-02 RX ORDER — MISOPROSTOL 200 UG/1
800 TABLET ORAL PRN
Status: DISCONTINUED | OUTPATIENT
Start: 2023-08-02 | End: 2023-08-04 | Stop reason: HOSPADM

## 2023-08-02 RX ORDER — NALOXONE HYDROCHLORIDE 0.4 MG/ML
INJECTION, SOLUTION INTRAMUSCULAR; INTRAVENOUS; SUBCUTANEOUS PRN
Status: DISCONTINUED | OUTPATIENT
Start: 2023-08-02 | End: 2023-08-02 | Stop reason: SDUPTHER

## 2023-08-02 RX ORDER — METHYLERGONOVINE MALEATE 0.2 MG/ML
200 INJECTION INTRAVENOUS PRN
Status: DISCONTINUED | OUTPATIENT
Start: 2023-08-02 | End: 2023-08-04 | Stop reason: HOSPADM

## 2023-08-02 RX ORDER — SODIUM CHLORIDE 9 MG/ML
INJECTION, SOLUTION INTRAVENOUS PRN
Status: DISCONTINUED | OUTPATIENT
Start: 2023-08-02 | End: 2023-08-04 | Stop reason: HOSPADM

## 2023-08-02 RX ADMIN — Medication 10 ML/HR: at 10:26

## 2023-08-02 RX ADMIN — FENTANYL CITRATE 75 MCG: 50 INJECTION, SOLUTION INTRAMUSCULAR; INTRAVENOUS at 10:04

## 2023-08-02 RX ADMIN — IBUPROFEN 800 MG: 800 TABLET ORAL at 21:20

## 2023-08-02 RX ADMIN — LIDOCAINE HYDROCHLORIDE,EPINEPHRINE BITARTRATE 3.5 ML: 20; .005 INJECTION, SOLUTION EPIDURAL; INFILTRATION; INTRACAUDAL; PERINEURAL at 10:04

## 2023-08-02 RX ADMIN — Medication 1 MILLI-UNITS/MIN: at 04:58

## 2023-08-02 RX ADMIN — FENTANYL CITRATE 25 MCG: 50 INJECTION, SOLUTION INTRAMUSCULAR; INTRAVENOUS at 09:54

## 2023-08-02 RX ADMIN — SODIUM CHLORIDE, POTASSIUM CHLORIDE, SODIUM LACTATE AND CALCIUM CHLORIDE: 600; 310; 30; 20 INJECTION, SOLUTION INTRAVENOUS at 04:56

## 2023-08-02 RX ADMIN — ACETAMINOPHEN 650 MG: 325 TABLET ORAL at 21:20

## 2023-08-02 ASSESSMENT — PAIN DESCRIPTION - LOCATION: LOCATION: ABDOMEN;BUTTOCKS;BACK

## 2023-08-02 ASSESSMENT — PAIN SCALES - GENERAL: PAINLEVEL_OUTOF10: 1

## 2023-08-02 NOTE — PROGRESS NOTES
0700 Bedside and Verbal shift change report given to Betty Lee RN (oncoming nurse) by Naida Ch RN (offgoing nurse). Report included the following information Index, Adult Overview, Intake/Output, MAR, and Recent Results. [address] and Roslyn Rodriguez MD to see pt. FB removed by MD at this time. Unable to perform SVE at this time. Bolus for epidural started     0946 time out for epidural with Daysi     1115 RN and Roslyn Rodriguez MD to bedside to assess pt. SVE by MD: 5/70/-3. AROM at this time, clear fluid     1334 RN and Roslyn Rodriguez MD to bedside to assess pt. SVE by MD: 6/80/-3. Pt placed on L side with peanut ball at this time    1430 MD reviewed strip. Early decels noted, orders received to continue with plan of care    [id number] RN and Roslyn Rodriguez MD to bedside to assess pt. SVE by MD: 10/100/+3. Pt feeling numb, orders received to reduce epidural dose at this time. 1809 pt called out to report feeling the urge to push     1810 Patient actively pushing. RN remains in continuous attendance at the bedside. Assessment & evaluation of fetal heart rate ongoing via continuous EFM.     1822 RN remained at bedside throughout pushing. EFM continuously assessed. Vaginal delivery of viable infant. 1900 Bedside and Verbal shift change report given to Katerin Love RN (oncoming nurse) by Betty Lee RN (offgoing nurse). Report included the following information Index, Adult Overview, Intake/Output, MAR, and Recent Results.

## 2023-08-02 NOTE — ANESTHESIA PROCEDURE NOTES
CSE Block    Patient location during procedure: OB  Start time: 8/2/2023 9:46 AM  End time: 8/2/2023 10:04 AM  Reason for block: labor epidural  Staffing  Performed: anesthesiologist   Anesthesiologist: Matt Rosales, DO  CSE  Patient position: sitting  Prep: Betadine  Patient monitoring: continuous pulse ox and frequent blood pressure checks  Approach: midline  Provider prep: mask and sterile gloves  Spinal Needle  Needle type: pencil-tip (Cameron)   Needle gauge: 27 G  Needle length: 4.75 in  Epidural Needle  Injection technique: PÉREZ air  Epidural needle type: [place] Loose. Needle gauge: 18 G  Needle length: 3.5 in  Location: lumbar (1-5)  Catheter  Catheter type: end hole  Catheter size: 20G.   Catheter at skin depth: 10 cm  Test dose: negative  Assessment  Hemodynamics: stable  Additional Notes  Tolerated well  Preanesthetic Checklist  Completed: patient identified, IV checked, site marked, risks and benefits discussed, surgical/procedural consents, equipment checked, pre-op evaluation, timeout performed, anesthesia consent given, oxygen available and monitors applied/VS acknowledged

## 2023-08-02 NOTE — DISCHARGE SUMMARY
Obstetrical Discharge Summary     Name: Rosi Valdez MRN: [id number]  SSN: [id number]    YOB: 1985  Age: 30 y.o. Sex: female      Allergies: Patient has no known allergies. Admit Date: 2023    Discharge Date: 2023     Admitting Physician: Veda Padilla MD     Attending Physician:  Etienne Ch DO     * Admission Diagnoses: Labor and delivery indication for care or intervention [O75.9]    * Discharge Diagnoses:   Information for the patient's :  Phillip Carlos Male Ita Sanchez [[id number]]   @[phone]@      Additional Diagnoses:    Lab Results   Component Value Date/Time    ABORH O POSITIVE 2023 04:46 PM      There is no immunization history on file for this patient. * Procedures:   * No surgery found *           * Discharge Condition: good    [facility] Course: Normal hospital course following the delivery. * Disposition: Home    Discharge Medications:      Medication List        ASK your doctor about these medications      enoxaparin 300 MG/3ML injection  Commonly known as: LOVENOX     PRENATAL 1 PLUS 1 PO              * Follow-up Care/Patient Instructions:   Activity: Activity as tolerated  Diet: Regular Diet  Wound Care: As directed    [unfilled]

## 2023-08-02 NOTE — L&D DELIVERY NOTE
Delivery Note:     Patient reached FD and pushed with good effort to deliver the fetal head in LOP position. No nuchal cord found. The anterior shoulder, followed by the posterior shoulder and the rest of the body then delivered easily. This was a BOY with Apgars of 9 and 9 at 1 and 5 minutes respectively, weight pending. The infant was placed on mom's abdomen. The cord was then double clamped and cut by the FOB. Cord blood was taken. The placenta followed spontaneously, intact, with 3VC. Pitocin was added to the IVF and the fundus was firm to palpation. The vagina, cervix and perineum were examined and 2nd degree perineal laceration was found and repaired to hemostasis using 2-0 vicryl suture.  mL. No complications. Mom and baby doing well. Dr. Cm Screws delivering. Juan Gross DO, [address] [facility], Male Klever Navarro [[id number]]      Labor Events     Labor: No  Cervical Ripening Date/Time:      Cervical Ripening Type: Marquez/EASI  Antibiotics Received during Labor: No  Rupture Date/Time:  23 11:15:00   Rupture Type:  Intact  Fluid Color: Clear  Fluid Odor: None  Fluid Volume: Large  Induction: Marquez Bulb (Balloon)  Augmentation: Oxytocin, AROM  Labor Complications: None              Anesthesia    Method: Epidural       Delivery Details      Delivery Date: 23 Delivery Time: 18:22:00   Delivery Type: Vaginal, Spontaneous               Presentation    Presentation: Vertex  Position: Right  _: Occiput  _: Anterior       Shoulder Dystocia    Shoulder Dystocia Present?: No       Assisted Delivery Details    Forceps Attempted?: No  Vacuum Extractor Attempted?: No                           Cord    Vessels: 3 Vessels  Complications: None  Delayed Cord Clamping?: Yes  Cord Clamped Date/Time: 2023 18:24:06  Cord Blood Disposition: Lab  Gases Sent?: No              Placenta    Date/Time: 2023 18:30:02  Removal: Expressed  Appearance:

## 2023-08-02 NOTE — PROGRESS NOTES
1900: Bedside and Verbal shift change report given to [facility] (oncoming nurse) by Angie Rodgers RN  (offgoing nurse). Report included the following information Nurse Handoff Report, Intake/Output, MAR, Recent Results, Med Rec Status, Quality Measures, and Event Log. Care assumed at this time. 2155: TRANSFER - OUT REPORT:    Verbal report given to Agusto RN on Angeli Baugh  being transferred to MIU for routine progression of patient care       Report consisted of patient's Situation, Background, Assessment and   Recommendations(SBAR). Information from the following report(s) Nurse Handoff Report, Adult Overview, Intake/Output, MAR, Recent Results, Quality Measures, and Event Log was reviewed with the receiving nurse. Lines:   Peripheral IV 08/01/23 Posterior;Right Hand (Active)   Site Assessment Clean, dry & intact 08/02/23 1924   Line Status Infusing 08/02/23 1924   Line Care Connections checked and tightened 08/02/23 1924   Phlebitis Assessment No symptoms 08/02/23 1924   Infiltration Assessment 0 08/02/23 1924   Alcohol Cap Used Yes 08/02/23 1924   Dressing Status Clean, dry & intact 08/02/23 1924   Dressing Type Transparent 08/02/23 1924        Opportunity for questions and clarification was provided. Patient transported with:  Registered Nurse. Care handed over at this time.

## 2023-08-02 NOTE — PROGRESS NOTES
Labor Progress Note  Patient seen, fetal heart rate and contraction pattern evaluated, patient examined.   Comfortable with epidural.    Patient Vitals for the past 2 hrs:   BP Pulse   08/02/23 1038 (!) 103/55 62   08/02/23 1036 -- 70   08/02/23 1035 (!) 94/50 71   08/02/23 1031 -- 68   08/02/23 1030 102/60 76   08/02/23 1029 (!) 103/58 62   08/02/23 1026 -- 66   08/02/23 1024 (!) 95/51 64   08/02/23 1020 (!) 79/50 85   08/02/23 1017 (!) 102/59 77   08/02/23 1016 -- 78       Physical Exam:  Cervical Exam:  5/70/-3  AROM clear fluid  Uterine Activity: q 4 min  pit at 10 mu/min  Fetal Heart Rate: Reactive    Assessment/Plan: IUP at 39w1d IOL hx DVT    Reassuring fetal status, Continue plan for vaginal delivery  GBS neg    Jack Stevens DO, [address] [facility]

## 2023-08-02 NOTE — PROGRESS NOTES
Labor Progress Note  Patient seen, fetal heart rate and contraction pattern evaluated, patient examined. No data found.     Physical Exam:  Cervical Exam:  6/80/-3  Uterine Activity: q 2-3 min pit at 14 mu/min  Fetal Heart Rate: Reactive cat 1    Assessment/Plan:  IUP at 39w1d IOL h/o DVT    Reassuring fetal status, Continue plan for vaginal delivery  GBS negative    Mary Perez, DO, [address] [facility] no

## 2023-08-02 NOTE — DISCHARGE INSTRUCTIONS
Discharge Instructions for Vaginal Delivery    Patient ID:  Elio Griffin  [id number]  89 y.o.  1985    Take Home Medications       Continue taking your prenatal vitamins if you are breastfeeding. Follow-up care is a key part of your treatment and safety. Please schedule and keep appointments. Follow-up with your primary OB in 6 weeks. Activity  Avoid anything in your vagina for 6 weeks (no intercourse, tampons, or douching). You may drive unless you are taking prescription pain medications. Climbing stairs and light lifting are okay. Please avoid excessive exercise, though walking is okay- you'll be tired! Diet  Regular diet as tolerated. Be sure to drink plenty of fluids if you are breastfeeding. Wound care  If you have stitches, continue to rinse with a squirt bottle of warm water each time you void for about 7-10 days. .  Your stitches will gradually dissolve over four to eight weeks. Sitz baths are also helpful to keep the wound clean, encourage healing, and to help with pain associated with the stitches or hemorrhoids. You can use either a sitz bath basin or a bathtub filled with 2-3\" inches of plain warm water. Soak for 10 minutes 3 times a day as tolerated. Pain Management  Over the counter medications such as Tylenol and ibuprofen (Motrin or Advil) are ideal.  These may be taken together, alternating doses. You may  take the maximum dose:  Motrin or Advil (generic ibuprofen), either 3 tablets every 6 hours or 4 tablets every 8 hours or Tylenol (acetominophen) 1000mg every 6 hours (equivalent to 2 extra strength Tylenol). You may also have a precrescription for stronger pain medication. Take only as needed and transition to over the counter medication in the next few days. Minimize amounts of the prescription medication, as it can be habit-forming and will worsen or cause constipation.  Most patients will find that within a couple of days, their pain is adequately controlled down.  Consistent pain that does not get better after you take pain medicine.   Sudden chest pain and shortness of breath  Signs of a blood clot: pain/ swelling/ increasing redness in your lower extremeties  Signs of infection: increased pain in your abdomen or vaginal area; red streaks, warmth, or tenderness of your breasts; fever of 100.5 F or greater

## 2023-08-02 NOTE — CARE COORDINATION
8/2/2023  11:14 AM    CM met with JOVITA to complete initial assessment and begin discharge planning. MOB verified and confirmed demographics. JOVITA lives with family, at the address on file. JOVITA reports she has good family support, and feels like she has the support she needs when she returns home. JOVITA plans to breast and bottle feed baby and has pump to use at home. Sean Espinoza will provide follow up care for infant. JOVITA has car seat, bassinet/crib, clothing, bottles and all necessary supplies for baby. JOVITA has [employer], and will be adding baby to this policy. CM discussed process to add baby to insurance, JOVITA verbalized understanding. 08/02/23 1114   Service Assessment   Patient Orientation Alert and Oriented   Cognition Alert   History Provided By Patient   Primary Caregiver Self   Support Systems Spouse/Significant Other   PCP Verified by CM Yes   Last Visit to PCP Within last 3 months   Prior Functional Level Independent in ADLs/IADLs   Current Functional Level Independent in ADLs/IADLs   Can patient return to prior living arrangement Yes   Ability to make needs known: Good   Family able to assist with home care needs: Yes   Would you like for me to discuss the discharge plan with any other family members/significant others, and if so, who?  No     CAITLIN Ramon

## 2023-08-03 LAB
ANION GAP SERPL CALC-SCNC: 6 MMOL/L (ref 5–15)
BUN SERPL-MCNC: 9 MG/DL (ref 6–20)
BUN/CREAT SERPL: 12 (ref 12–20)
CALCIUM SERPL-MCNC: 8.5 MG/DL (ref 8.5–10.1)
CHLORIDE SERPL-SCNC: 113 MMOL/L (ref 97–108)
CO2 SERPL-SCNC: 21 MMOL/L (ref 21–32)
CREAT SERPL-MCNC: 0.73 MG/DL (ref 0.55–1.02)
GLUCOSE SERPL-MCNC: 144 MG/DL (ref 65–100)
POTASSIUM SERPL-SCNC: 3.6 MMOL/L (ref 3.5–5.1)
SODIUM SERPL-SCNC: 140 MMOL/L (ref 136–145)

## 2023-08-03 PROCEDURE — 6360000002 HC RX W HCPCS: Performed by: OBSTETRICS & GYNECOLOGY

## 2023-08-03 PROCEDURE — 1100000000 HC RM PRIVATE

## 2023-08-03 PROCEDURE — 6370000000 HC RX 637 (ALT 250 FOR IP): Performed by: OBSTETRICS & GYNECOLOGY

## 2023-08-03 PROCEDURE — 80048 BASIC METABOLIC PNL TOTAL CA: CPT

## 2023-08-03 RX ADMIN — ENOXAPARIN SODIUM 40 MG: 100 INJECTION SUBCUTANEOUS at 09:22

## 2023-08-03 RX ADMIN — ACETAMINOPHEN 650 MG: 325 TABLET ORAL at 20:58

## 2023-08-03 RX ADMIN — FERROUS SULFATE TAB 325 MG (65 MG ELEMENTAL FE) 325 MG: 325 (65 FE) TAB at 09:22

## 2023-08-03 RX ADMIN — FERROUS SULFATE TAB 325 MG (65 MG ELEMENTAL FE) 325 MG: 325 (65 FE) TAB at 20:58

## 2023-08-03 RX ADMIN — IBUPROFEN 800 MG: 800 TABLET ORAL at 05:05

## 2023-08-03 RX ADMIN — IBUPROFEN 800 MG: 800 TABLET ORAL at 13:00

## 2023-08-03 RX ADMIN — DOCUSATE SODIUM 100 MG: 100 CAPSULE, LIQUID FILLED ORAL at 09:21

## 2023-08-03 RX ADMIN — DOCUSATE SODIUM 100 MG: 100 CAPSULE, LIQUID FILLED ORAL at 20:57

## 2023-08-03 RX ADMIN — ACETAMINOPHEN 650 MG: 325 TABLET ORAL at 05:05

## 2023-08-03 RX ADMIN — ACETAMINOPHEN 650 MG: 325 TABLET ORAL at 01:21

## 2023-08-03 RX ADMIN — ACETAMINOPHEN 650 MG: 325 TABLET ORAL at 13:00

## 2023-08-03 RX ADMIN — IBUPROFEN 800 MG: 800 TABLET ORAL at 20:58

## 2023-08-03 ASSESSMENT — PAIN SCALES - GENERAL
PAINLEVEL_OUTOF10: 5
PAINLEVEL_OUTOF10: 4
PAINLEVEL_OUTOF10: 3

## 2023-08-03 ASSESSMENT — PAIN DESCRIPTION - LOCATION
LOCATION: ABDOMEN;PERINEUM
LOCATION: ABDOMEN
LOCATION: ABDOMEN

## 2023-08-03 ASSESSMENT — PAIN DESCRIPTION - DESCRIPTORS
DESCRIPTORS: CRAMPING
DESCRIPTORS: CRAMPING
DESCRIPTORS: ACHING;CRAMPING

## 2023-08-03 ASSESSMENT — PAIN DESCRIPTION - ORIENTATION: ORIENTATION: LOWER

## 2023-08-03 ASSESSMENT — PAIN - FUNCTIONAL ASSESSMENT: PAIN_FUNCTIONAL_ASSESSMENT: ACTIVITIES ARE NOT PREVENTED

## 2023-08-03 NOTE — LACTATION NOTE
This note was copied from a baby's chart. This is mother's first baby but first to breastfeed. She breast fed baby last night and hand been formula feeding. Mother had formula fed her baby just prior to visit. Baby put to breast and breastfeeding was attempted but baby sleeping and not interested. LC reviewed feeding cues, hand expression, positions for feeding and latch, nutrition. LC also discussed the option of pumping. Discussed with mother her plan for feeding. Reviewed the benefits of exclusive breast milk feeding during the hospital stay. Informed her of the risks of using formula to supplement in the first few days of life as well as the benefits of successful breast milk feeding; referred her to the Breastfeeding booklet about this information. She acknowledges understanding of information reviewed and states that it is her plan to breast/bottle feed her infant. Will support her choice and offer additional information as needed. Encouraged mom to attempt feeding with baby led feeding cues. Just as sucking on fingers, rooting, mouthing. Looking for 8-12 feedings in 24 hours. Don't limit baby at breast, allow baby to come of breast on it's own. Baby may want to feed  often and may increase number of feedings on second day of life. Skin to skin encouraged. If baby doesn't nurse,  Mom should  hand express  10-20 drops of colostrum and drip into baby's mouth, or give to baby by finger feeding, cup feeding, or spoon feeding at least every 2-3 hours. Discussed eating a healthy diet. Instructed mother to eat a variety of foods in order to get a well balanced diet. She should consume an extra 500 calories per day (more than her non-pregnant requirement.) These extra calories will help provide energy needed for optimal breast milk production. Mother also encouraged to \"drink to thirst\" and it is recommended that she drink fluids such as water, fruit/vegetable juice.  Nutritious snacks

## 2023-08-04 VITALS
SYSTOLIC BLOOD PRESSURE: 104 MMHG | RESPIRATION RATE: 16 BRPM | WEIGHT: 205 LBS | BODY MASS INDEX: 34.16 KG/M2 | TEMPERATURE: 98 F | DIASTOLIC BLOOD PRESSURE: 63 MMHG | OXYGEN SATURATION: 97 % | HEART RATE: 67 BPM | HEIGHT: 65 IN

## 2023-08-04 PROCEDURE — 6360000002 HC RX W HCPCS: Performed by: OBSTETRICS & GYNECOLOGY

## 2023-08-04 PROCEDURE — 6370000000 HC RX 637 (ALT 250 FOR IP): Performed by: OBSTETRICS & GYNECOLOGY

## 2023-08-04 RX ORDER — PSEUDOEPHEDRINE HCL 30 MG
100 TABLET ORAL 2 TIMES DAILY
Qty: 60 CAPSULE | Refills: 3 | Status: SHIPPED | OUTPATIENT
Start: 2023-08-04

## 2023-08-04 RX ORDER — IBUPROFEN 800 MG/1
800 TABLET ORAL EVERY 8 HOURS PRN
Qty: 40 TABLET | Refills: 1 | Status: SHIPPED | OUTPATIENT
Start: 2023-08-04

## 2023-08-04 RX ADMIN — IBUPROFEN 800 MG: 800 TABLET ORAL at 06:14

## 2023-08-04 RX ADMIN — ACETAMINOPHEN 650 MG: 325 TABLET ORAL at 06:14

## 2023-08-04 RX ADMIN — FERROUS SULFATE TAB 325 MG (65 MG ELEMENTAL FE) 325 MG: 325 (65 FE) TAB at 08:36

## 2023-08-04 RX ADMIN — DOCUSATE SODIUM 100 MG: 100 CAPSULE, LIQUID FILLED ORAL at 08:36

## 2023-08-04 RX ADMIN — ENOXAPARIN SODIUM 40 MG: 100 INJECTION SUBCUTANEOUS at 08:35

## 2023-08-04 ASSESSMENT — PAIN DESCRIPTION - ORIENTATION: ORIENTATION: ANTERIOR

## 2023-08-04 ASSESSMENT — PAIN - FUNCTIONAL ASSESSMENT: PAIN_FUNCTIONAL_ASSESSMENT: ACTIVITIES ARE NOT PREVENTED

## 2023-08-04 ASSESSMENT — PAIN DESCRIPTION - LOCATION: LOCATION: ABDOMEN

## 2023-08-04 ASSESSMENT — PAIN DESCRIPTION - DESCRIPTORS: DESCRIPTORS: CRAMPING

## 2023-08-04 ASSESSMENT — PAIN SCALES - GENERAL: PAINLEVEL_OUTOF10: 2

## 2023-08-04 NOTE — LACTATION NOTE
This note was copied from a baby's chart. Mother /baby for discharge. Mother states she has been formula feeding her baby but wants to breastfeed and pump. She last breast fed baby yesterday and has not been pumping. LC discussed that her milk will still come in and she needs to stimulate her breasts and may get engorged. LC offered to assist mother with breastfeeding - mother states she will call if she wants help. Mother gave baby formula at 56. LC discussed the following:    Pumping/storage and preparation of expressed breast milk for baby - mother has an electric pump for home and also wanted a hand pump for home use -  [employer] hand pump given with instructions for use. Engorgement Care Guidelines:  Reviewed how milk is made and normal phases of milk production. Taught care of engorged breasts - physiologic breastfeeding/pump Q 2-3 hours encouraged with use of cool packs (no ice directly on skin). Consider use of NSAIDS where appropriate for discomfort and inflammation. Can employ light touch, lymphatic drainage techniques on tender grandular tissues. Anticipatory guidance shared. Discussed eating a healthy diet. Instructed mother to eat a variety of foods in order to get a well balanced diet. She should consume an extra 500 calories per day (more than her non-pregnant requirement.) These extra calories will help provide energy needed for optimal breast milk production. Mother also encouraged to \"drink to thirst\" and it is recommended that she drink fluids such as water, fruit/vegetable juice. Nutritious snacks should be available so that she can eat throughout the day to help satisfy her hunger and maintain a good milk supply. Mother  will successfully establish breastfeeding by feeding in response to early feeding cues   or wake every 3h, will obtain deep latch, and will keep log of feedings/output.   Taught to BF at hunger cues and or q 2-3 hrs and to offer 10-20 drops of hand expressed

## 2023-08-06 RX ORDER — FENTANYL CITRATE 50 UG/ML
INJECTION, SOLUTION INTRAMUSCULAR; INTRAVENOUS PRN
Status: DISCONTINUED | OUTPATIENT
Start: 2023-08-02 | End: 2023-08-06 | Stop reason: SDUPTHER

## 2023-08-06 RX ORDER — LIDOCAINE HYDROCHLORIDE AND EPINEPHRINE 20; 5 MG/ML; UG/ML
INJECTION, SOLUTION EPIDURAL; INFILTRATION; INTRACAUDAL; PERINEURAL PRN
Status: DISCONTINUED | OUTPATIENT
Start: 2023-08-02 | End: 2023-08-06 | Stop reason: SDUPTHER

## 2023-08-06 NOTE — ANESTHESIA POSTPROCEDURE EVALUATION
Department of Anesthesiology  Postprocedure Note    Patient: Siddharth Shoulders  MRN: [id number]  YOB: 1985  Date of evaluation: 8/6/2023      Procedure Summary     Date: 08/02/23 Room / Location:     Anesthesia Start: [facility] Anesthesia Stop: 1822    Procedure: Labor Analgesia Diagnosis:     Scheduled Providers:  Responsible Provider: Hanna Diaz DO    Anesthesia Type: spinal, epidural ASA Status: 3          Anesthesia Type: No value filed. Eladio Phase I:      Eladio Phase II:        Anesthesia Post Evaluation    Comments: Discharged per protocol.